# Patient Record
Sex: FEMALE | Race: ASIAN | NOT HISPANIC OR LATINO | Employment: FULL TIME | URBAN - METROPOLITAN AREA
[De-identification: names, ages, dates, MRNs, and addresses within clinical notes are randomized per-mention and may not be internally consistent; named-entity substitution may affect disease eponyms.]

---

## 2021-03-19 ENCOUNTER — OFFICE VISIT (OUTPATIENT)
Dept: OBGYN | Facility: CLINIC | Age: 31
End: 2021-03-19
Payer: COMMERCIAL

## 2021-03-19 VITALS
WEIGHT: 128 LBS | DIASTOLIC BLOOD PRESSURE: 62 MMHG | BODY MASS INDEX: 21.33 KG/M2 | SYSTOLIC BLOOD PRESSURE: 92 MMHG | HEIGHT: 65 IN

## 2021-03-19 DIAGNOSIS — Z01.419 ENCOUNTER FOR GYNECOLOGICAL EXAMINATION WITHOUT ABNORMAL FINDING: Primary | ICD-10-CM

## 2021-03-19 DIAGNOSIS — Z80.0 FAMILY HISTORY OF PANCREATIC CANCER: ICD-10-CM

## 2021-03-19 DIAGNOSIS — Z13.29 SCREENING FOR THYROID DISORDER: ICD-10-CM

## 2021-03-19 LAB
ERYTHROCYTE [DISTWIDTH] IN BLOOD BY AUTOMATED COUNT: 11.5 % (ref 10–15)
HCT VFR BLD AUTO: 39.1 % (ref 35–47)
HGB BLD-MCNC: 13.1 G/DL (ref 11.7–15.7)
MCH RBC QN AUTO: 31.3 PG (ref 26.5–33)
MCHC RBC AUTO-ENTMCNC: 33.5 G/DL (ref 31.5–36.5)
MCV RBC AUTO: 93 FL (ref 78–100)
PLATELET # BLD AUTO: 197 10E9/L (ref 150–450)
RBC # BLD AUTO: 4.19 10E12/L (ref 3.8–5.2)
WBC # BLD AUTO: 6.2 10E9/L (ref 4–11)

## 2021-03-19 PROCEDURE — G0145 SCR C/V CYTO,THINLAYER,RESCR: HCPCS | Performed by: OBSTETRICS & GYNECOLOGY

## 2021-03-19 PROCEDURE — 36415 COLL VENOUS BLD VENIPUNCTURE: CPT | Performed by: OBSTETRICS & GYNECOLOGY

## 2021-03-19 PROCEDURE — 80053 COMPREHEN METABOLIC PANEL: CPT | Performed by: OBSTETRICS & GYNECOLOGY

## 2021-03-19 PROCEDURE — 85027 COMPLETE CBC AUTOMATED: CPT | Performed by: OBSTETRICS & GYNECOLOGY

## 2021-03-19 PROCEDURE — 99385 PREV VISIT NEW AGE 18-39: CPT | Performed by: OBSTETRICS & GYNECOLOGY

## 2021-03-19 PROCEDURE — 84443 ASSAY THYROID STIM HORMONE: CPT | Performed by: OBSTETRICS & GYNECOLOGY

## 2021-03-19 ASSESSMENT — ANXIETY QUESTIONNAIRES
GAD7 TOTAL SCORE: 0
3. WORRYING TOO MUCH ABOUT DIFFERENT THINGS: NOT AT ALL
5. BEING SO RESTLESS THAT IT IS HARD TO SIT STILL: NOT AT ALL
7. FEELING AFRAID AS IF SOMETHING AWFUL MIGHT HAPPEN: NOT AT ALL
IF YOU CHECKED OFF ANY PROBLEMS ON THIS QUESTIONNAIRE, HOW DIFFICULT HAVE THESE PROBLEMS MADE IT FOR YOU TO DO YOUR WORK, TAKE CARE OF THINGS AT HOME, OR GET ALONG WITH OTHER PEOPLE: NOT DIFFICULT AT ALL
1. FEELING NERVOUS, ANXIOUS, OR ON EDGE: NOT AT ALL
6. BECOMING EASILY ANNOYED OR IRRITABLE: NOT AT ALL
2. NOT BEING ABLE TO STOP OR CONTROL WORRYING: NOT AT ALL

## 2021-03-19 ASSESSMENT — PATIENT HEALTH QUESTIONNAIRE - PHQ9
SUM OF ALL RESPONSES TO PHQ QUESTIONS 1-9: 0
5. POOR APPETITE OR OVEREATING: NOT AT ALL

## 2021-03-19 ASSESSMENT — MIFFLIN-ST. JEOR: SCORE: 1298.54

## 2021-03-19 NOTE — PROGRESS NOTES
La is a 30 year old  female who presents for annual exam.     Besides routine health maintenance, has been trying for pregnancy for 3 months.    HPI:    She has been trying for conception for 3 months.  She just started checking ovulation.  She has regular cycles.  She has started pre- vitamins.  She doesn't have dysmenorrhea.  She usually has 28 day intervals.  She started taking ovulation tests last month.  Ovulation was 18 and 20 day of cycle.    Her dad  of pancreatic cancer at 45.       GYNECOLOGIC HISTORY:    Patient's last menstrual period was 2021.    Regular menses? yes  Menses every 28-33 days.  Length of menses: 3 days    Her current contraception method is: none, trying to conceive.  She  reports that she has never smoked. She has never used smokeless tobacco.  Patient is sexually active.    Last PHQ-9 score on record =   PHQ-9 SCORE 3/19/2021   PHQ-9 Total Score 0     Last GAD7 score on record =   FERMIN-7 SCORE 3/19/2021   Total Score 0     Alcohol Score = 1    HEALTH MAINTENANCE:  Cholesterol: (No results found for: CHOL   Last Mammo: N/A, Result: not applicable, Next Mammo: screen age 40  Pap: (No results found for: PAP )   Colonoscopy:  N/A, Result: not applicable, Next Colonoscopy: screen age 50  Dexa: N/A  Health maintenance updated:  Due for Pap    HISTORY:  OB History    Para Term  AB Living   0 0 0 0 0 0   SAB TAB Ectopic Multiple Live Births   0 0 0 0 0       There is no problem list on file for this patient.    Past Surgical History:   Procedure Laterality Date     NO HISTORY OF SURGERY        Social History     Tobacco Use     Smoking status: Never Smoker     Smokeless tobacco: Never Used   Substance Use Topics     Alcohol use: Not on file      Problem (# of Occurrences) Relation (Name,Age of Onset)    Diabetes (1) Maternal Grandfather    Heart Disease (1) Maternal Grandfather    Pancreatic Cancer (1) Father (45)    Thyroid Disease (2) Mother, Maternal  "Grandmother            Current Outpatient Medications   Medication Sig     Prenatal Vit-Fe Fumarate-FA (PRENATAL VITAMINS PO)      No current facility-administered medications for this visit.      No Known Allergies    Past medical, surgical, social and family histories were reviewed and updated in EPIC.    ROS:   Review of systems negative, no abnormal bleeding.  No urinary frequency or dysuria, bladder or kidney problems    EXAM:  BP 92/62   Ht 1.638 m (5' 4.5\")   Wt 58.1 kg (128 lb)   LMP 03/01/2021   BMI 21.63 kg/m     BMI: Body mass index is 21.63 kg/m .    PHYSICAL EXAM:  Constitutional:   Appearance: Well nourished, well developed, alert, in no acute distress  Neck:  Lymph Nodes:  No lymphadenopathy present    Thyroid:  Gland size normal, nontender, no nodules or masses present  on palpation  Chest:  Respiratory Effort:  Breathing unlabored  Cardiovascular:    Heart: Auscultation:  Regular rate, normal rhythm, no murmurs present  Breasts: Inspection of Breasts:  No lymphadenopathy present., Palpation of Breasts and Axillae:  No masses present on palpation, no breast tenderness., Axillary Lymph Nodes:  No lymphadenopathy present. and No nodularity, asymmetry or nipple discharge bilaterally.  Gastrointestinal:   Abdominal Examination:  Abdomen nontender to palpation, tone normal without rigidity or guarding, no masses present, umbilicus without lesions   Liver and Spleen:  No hepatomegaly present, liver nontender to palpation    Hernias:  No hernias present  Lymphatic: Lymph Nodes:  No other lymphadenopathy present  Skin:  General Inspection:  No rashes present, no lesions present, no areas of  discoloration  Neurologic:    Mental Status:  Oriented X3.  Normal strength and tone, sensory exam                grossly normal, mentation intact and speech normal.    Psychiatric:   Mentation appears normal and affect normal/bright.         Pelvic Exam:  External Genitalia:     Normal appearance for age, no " discharge present, no tenderness present, no inflammatory lesions present, color normal  Vagina:     Normal vaginal vault without central or paravaginal defects, no discharge present, no inflammatory lesions present, no masses present  Bladder:     Nontender to palpation  Urethra:   Urethral Body:  Urethra palpation normal, urethra structural support normal   Urethral Meatus:  No erythema or lesions present  Cervix:     Appearance healthy, no lesions present, nontender to palpation, no bleeding present  Uterus:     Uterus: firm, normal sized and nontender  Adnexa:     No adnexal tenderness present, no adnexal masses present  Perineum:     Perineum within normal limits, no evidence of trauma, no rashes or skin lesions present  Anus:     Anus within normal limits, no hemorrhoids present  Inguinal Lymph Nodes:     No lymphadenopathy present  Pubic Hair:     Normal pubic hair distribution for age  Genitalia and Groin:     No rashes present, no lesions present, no areas of discoloration, no masses present      COUNSELING:   Reviewed preventive health counseling, as reflected in patient instructions    BMI: Body mass index is 21.63 kg/m .      ASSESSMENT:  30 year old female with satisfactory annual exam.    ICD-10-CM    1. Encounter for gynecological examination without abnormal finding  Z01.419 Pap imaged thin layer screen reflex to HPV if ASCUS - recommended age 25 - 29 years     CBC with platelets     **Comprehensive metabolic panel FUTURE anytime   2. Screening for thyroid disorder  Z13.29 TSH with free T4 reflex   3. Family history of pancreatic cancer  Z80.0 CANCER RISK MGMT/CANCER GENETIC COUNSELING REFERRAL       PLAN:  1. Pap today for cervical cancer screening  2. Dad at 45  of pancreatic cancer, unsure if he had testing.  Discussed genetic counseling  3. Conception- discussed thyroid testing also.  She is taking prenatal vitamins.  She is timing cycles.  Discussed timing intercourse.    Melinda Luciano  MD Linda

## 2021-03-20 LAB
ALBUMIN SERPL-MCNC: 3.7 G/DL (ref 3.4–5)
ALP SERPL-CCNC: 58 U/L (ref 40–150)
ALT SERPL W P-5'-P-CCNC: 20 U/L (ref 0–50)
ANION GAP SERPL CALCULATED.3IONS-SCNC: 4 MMOL/L (ref 3–14)
AST SERPL W P-5'-P-CCNC: 14 U/L (ref 0–45)
BILIRUB SERPL-MCNC: 0.5 MG/DL (ref 0.2–1.3)
BUN SERPL-MCNC: 12 MG/DL (ref 7–30)
CALCIUM SERPL-MCNC: 8.6 MG/DL (ref 8.5–10.1)
CHLORIDE SERPL-SCNC: 107 MMOL/L (ref 94–109)
CO2 SERPL-SCNC: 26 MMOL/L (ref 20–32)
CREAT SERPL-MCNC: 0.71 MG/DL (ref 0.52–1.04)
GFR SERPL CREATININE-BSD FRML MDRD: >90 ML/MIN/{1.73_M2}
GLUCOSE SERPL-MCNC: 77 MG/DL (ref 70–99)
POTASSIUM SERPL-SCNC: 4.2 MMOL/L (ref 3.4–5.3)
PROT SERPL-MCNC: 7.1 G/DL (ref 6.8–8.8)
SODIUM SERPL-SCNC: 137 MMOL/L (ref 133–144)
TSH SERPL DL<=0.005 MIU/L-ACNC: 2.34 MU/L (ref 0.4–4)

## 2021-03-20 ASSESSMENT — ANXIETY QUESTIONNAIRES: GAD7 TOTAL SCORE: 0

## 2021-03-24 LAB
COPATH REPORT: NORMAL
PAP: NORMAL

## 2021-04-12 NOTE — PROGRESS NOTES
4/13/2021    La is a 30 year old who is being evaluated via a billable video visit.      Video-Visit Details    Type of service: Video Visit    Video Start Time: 12:04 pm  Video End Time: 12:59 pm    Originating Location (pt. Location): Home    Distant Location (provider location): [facility] Risk [facility]    Platform used for Video Visit: [employer]lette    Referring Provider: Melinda Mckeon MD    Presenting Information:   I spoke with La Cornelius over video today for genetic counseling to discuss her family history of cancer. With her permission, this appointment was conducted over video due to COVID-19 precautions. We talked today to review this history, cancer screening recommendations, and available genetic testing options.    Personal History:  La is a 30 year old female. She does not have any personal history of cancer.    She had her first menstrual period at age 14 or 15 and is premenopausal. She does not have any children, but is currently trying to conceive. La has her ovaries, fallopian tubes and uterus in place. She reports that she has not used hormone replacement therapy. She had a right breast ultrasound and diagnostic mammogram on 6/26/19 due to a lump, findings were negative. La has not had a colonoscopy due to her age. La reported no history of tobacco use and alcohol use of 2-3 drinks per month.    Family History: (Please see scanned pedigree for detailed family history information)  Maternal:    Her mother is 50 years old with no known history of cancer.    She is not aware of any diagnoses of cancer in her maternal relatives.  Paternal:    Her father was diagnosed with pancreatic cancer at age 45 and passed away at 45. Treatment included surgery and chemotherapy. He did not drink and had no known chemical exposures. He did have a history of smoking.     Her grandmother is in her mid-late 70s and was first diagnosed with a colon/other GI cancer in 1991 (in  approximately her 40s). Treatment included surgery. She had a recurrence of this cancer in 2014 and again had surgery.     She is not aware of any genetic testing in her paternal relatives.    Her maternal ethnicity is Japanese. Her paternal ethnicity is Japanese. There is no known Ashkenazi Muslim ancestry on either side of her family.     Discussion:    La's family history of cancer is suggestive of a hereditary cancer syndrome.    We reviewed the features of sporadic, familial, and hereditary cancers. In looking at La's family history, it is possible that a cancer susceptibility gene is present due to her father's diagnosis of pancreatic cancer and the history of colon/GI cancer in her paternal grandmother.    We discussed the natural history and genetics of hereditary cancer. Based on her father's diagnosis of pancreatic cancer, we discussed the BRCA1 and BRCA2 genes. Mutations in these genes cause a condition known as Hereditary Breast and Ovarian Cancer syndrome (HBOC). Women with a mutation in either of these genes are at increased risk for breast and ovarian cancer. There is also an increased risk for a second primary breast cancer. Men with a mutation in either of these genes are at increased risk for breast and prostate cancer. Both women and men may also be at increased risk for pancreatic cancer and melanoma.     Additionally, based on her family history of pancreatic and colon/GI cancer, we discussed Patiño syndrome. Patiño syndrome can be caused by a mutation in one of five genes:  MLH1, MSH2, MSH6, PMS2, and EPCAM. The highest cancer risks associated with Patiño syndrome include colon cancer, endometrial/uterine cancer, gastric cancer, and ovarian cancer. Other cancers have also been reported with Patiño syndrome, such as urinary tract, pancreas, bile duct, and others.     A detailed handout regarding these genes and other genes in which mutations are associated with an increased risk for pancreatic  cancer will be provided to La via [employer] and can be found in the after visit summary. Topics included: inheritance pattern, cancer risks, cancer screening recommendations, and also risks, benefits and limitations of testing.    Based on her personal and family history, La meets current National Comprehensive Cancer Network (NCCN) criteria for genetic testing of pancreatic cancer susceptibility genes, as well as high-penetrance breast and/or ovarian cancer susceptibility genes, which often includes BRCA1, BRCA2, CDH1, PALB2, PTEN, and TP53 among others.     We discussed that there are additional genes that could cause increased risk for pancreatic and colon cancer. As many of these genes present with overlapping features in a family and accurate cancer risk cannot always be established based upon the pedigree analysis alone, it would be reasonable for La to consider panel genetic testing to analyze multiple genes at once.    We reviewed genetic testing options for La based on her family history of cancers: a smaller panel of genes associated with specific cancers (such as pancreatic and/or colon) or larger panel options to include genes associated with multiple different types of cancer. She expressed an interest in more broad testing and opted for the CancerNext panel.   Genetic testing is available for 36 genes associated with hereditary cancer: CancerNext (APC, JANAE, AXIN2, BARD1, BMPR1A, BRCA1, BRCA2, BRIP1, CDH1, CDK4, CDKN2A, CHEK2, DICER1, EPCAM, GREM1, HOXB13, MLH1, MSH2, MSH3, MSH6, MUTYH, NBN, NF1, NTHL1, PALB2, PMS2, POLD1, POLE, PTEN, RAD51C, RAD51D, RECQL, SMAD4, SMARCA4, STK11, and TP53).  We discussed that many of the genes in the CancerNext panel are associated with specific hereditary cancer syndromes and published management guidelines: Hereditary Breast and Ovarian Cancer syndrome (BRCA1, BRCA2), Patiño syndrome (MLH1, MSH2, MSH6, PMS2, EPCAM), Familial Adenomatous Polyposis (APC),  Hereditary Diffuse Gastric Cancer (CDH1), Familial Atypical Multiple Mole Melanoma syndrome (CDK4, CDKN2A), Juvenile Polyposis syndrome (BMPR1A, SMAD4), Cowden syndrome (PTEN), Li Fraumeni syndrome (TP53), Peutz-Jeghers syndrome (STK11), MUTYH Associated Polyposis (MUTYH), and Neurofibromatosis type 1 (NF1).   The JANAE, AXIN2, BRIP1, CHEK2, GREM1, MSH3, NBN, NTHL1, PALB2, POLD1, POLE, RAD51C, and RAD51D genes are associated with increased cancer risk and have published management guidelines for certain cancers.    The remaining genes (BARD1, DICER1, HOXB13, RECQL, and SMARCA4) are associated with increased cancer risk and may allow us to make medical recommendations when mutations are identified.      Due to COVID-19 precautions consent was obtained over video today. This is indicated on the consent form, which also includes my signature (as the provider who obtained consent). Genetic testing via the CancerNext panel will be sent to [facility]. La opted to have a saliva sample collection kit shipped directly to her home from [facility]. She will ship the kit back to [facility] for analysis. Turnaround time from date when sample is received at the lab: approximately 3-4 weeks.    Medical Management: For La, we reviewed that the information from genetic testing may determine:    additional cancer screening for which La may qualify (i.e. pancreatic cancer screening, mammogram and breast MRI, more frequent colonoscopies, more frequent dermatologic exams, etc.),    options for risk reducing surgeries La could consider (i.e. bilateral mastectomy, surgery to remove her ovaries and/or uterus, etc.),      and targeted chemotherapies if she were to develop certain cancers in the future (i.e. immunotherapy for individuals with Patiño syndrome, PARP inhibitors, etc.).     These recommendations will be discussed in detail once genetic testing is completed.     Plan:  1) Today La elected to proceed with  genetic testing via the CancerNext panel offered by [email or web address].  2) This information should be available in 4-5 weeks.  3) La will be scheduled for a virtual visit (phone or video) to discuss the results.    Time spent over video: 55 minutes    Seble Escudero MS, [facility] [place]  Licensed, Certified Genetic Counselor  Office: [phone]  Email: [email or web address]

## 2021-04-13 ENCOUNTER — VIRTUAL VISIT (OUTPATIENT)
Dept: ONCOLOGY | Facility: CLINIC | Age: 31
End: 2021-04-13
Attending: OBSTETRICS & GYNECOLOGY
Payer: COMMERCIAL

## 2021-04-13 DIAGNOSIS — Z80.0 FAMILY HISTORY OF COLON CANCER: ICD-10-CM

## 2021-04-13 DIAGNOSIS — Z80.0 FAMILY HISTORY OF PANCREATIC CANCER: Primary | ICD-10-CM

## 2021-04-13 PROCEDURE — 96040 HC GENETIC COUNSELING, EACH 30 MINUTES: CPT | Mod: GT | Performed by: GENETIC COUNSELOR, MS

## 2021-04-13 NOTE — LETTER
Cancer Risk Management  Program Locations    [facility]  [facility]  [facility]  [facility]  [facility]  Mailing Address  Cancer Risk Management Program  [address]  [place] [address]    New patient appointments  [phone]  April 17, 2021    La Cornelius  [address]  [place] [address]      Dear La,    It was a pleasure speaking with you over video for genetic counseling on 4/13/2021. Here is a copy of the progress note from our discussion. If you have any additional questions, please feel free to call.    Referring Provider: Melinda Mckeon MD    Presenting Information:   I spoke with La Cornelius over video today for genetic counseling to discuss her family history of cancer. With her permission, this appointment was conducted over video due to COVID-19 precautions. We talked today to review this history, cancer screening recommendations, and available genetic testing options.    Personal History:  aL is a 30 year old female. She does not have any personal history of cancer.    She had her first menstrual period at age 14 or 15 and is premenopausal. She does not have any children, but is currently trying to conceive. La has her ovaries, fallopian tubes and uterus in place. She reports that she has not used hormone replacement therapy. She had a right breast ultrasound and diagnostic mammogram on 6/26/19 due to a lump, findings were negative. La has not had a colonoscopy due to her age. La reported no history of tobacco use and alcohol use of 2-3 drinks per month.    Family History: (Please see scanned pedigree for detailed family history information)  Maternal:    Her mother is 50 years old with no known history of cancer.    She is not aware of any diagnoses of cancer in her maternal  relatives.  Paternal:    Her father was diagnosed with pancreatic cancer at age 45 and passed away at 45. Treatment included surgery and chemotherapy. He did not drink and had no known chemical exposures. He did have a history of smoking.     Her grandmother is in her mid-late 70s and was first diagnosed with a colon/other GI cancer in 1991 (in approximately her 40s). Treatment included surgery. She had a recurrence of this cancer in 2014 and again had surgery.     She is not aware of any genetic testing in her paternal relatives.    Her maternal ethnicity is Micronesian. Her paternal ethnicity is Micronesian. There is no known Ashkenazi Oriental orthodox ancestry on either side of her family.     Discussion:    La's family history of cancer is suggestive of a hereditary cancer syndrome.    We reviewed the features of sporadic, familial, and hereditary cancers. In looking at La's family history, it is possible that a cancer susceptibility gene is present due to her father's diagnosis of pancreatic cancer and the history of colon/GI cancer in her paternal grandmother.    We discussed the natural history and genetics of hereditary cancer. Based on her father's diagnosis of pancreatic cancer, we discussed the BRCA1 and BRCA2 genes. Mutations in these genes cause a condition known as Hereditary Breast and Ovarian Cancer syndrome (HBOC). Women with a mutation in either of these genes are at increased risk for breast and ovarian cancer. There is also an increased risk for a second primary breast cancer. Men with a mutation in either of these genes are at increased risk for breast and prostate cancer. Both women and men may also be at increased risk for pancreatic cancer and melanoma.     Additionally, based on her family history of pancreatic and colon/GI cancer, we discussed Patiño syndrome. Patiño syndrome can be caused by a mutation in one of five genes:  MLH1, MSH2, MSH6, PMS2, and EPCAM. The highest cancer risks associated with Patiño  syndrome include colon cancer, endometrial/uterine cancer, gastric cancer, and ovarian cancer. Other cancers have also been reported with Patiño syndrome, such as urinary tract, pancreas, bile duct, and others.     A detailed handout regarding these genes and other genes in which mutations are associated with an increased risk for pancreatic cancer will be provided to La via [employer] and can be found in the after visit summary. Topics included: inheritance pattern, cancer risks, cancer screening recommendations, and also risks, benefits and limitations of testing.    Based on her personal and family history, La meets current National Comprehensive Cancer Network (NCCN) criteria for genetic testing of pancreatic cancer susceptibility genes, as well as high-penetrance breast and/or ovarian cancer susceptibility genes, which often includes BRCA1, BRCA2, CDH1, PALB2, PTEN, and TP53 among others.     We discussed that there are additional genes that could cause increased risk for pancreatic and colon cancer. As many of these genes present with overlapping features in a family and accurate cancer risk cannot always be established based upon the pedigree analysis alone, it would be reasonable for La to consider panel genetic testing to analyze multiple genes at once.    We reviewed genetic testing options for La based on her family history of cancers: a smaller panel of genes associated with specific cancers (such as pancreatic and/or colon) or larger panel options to include genes associated with multiple different types of cancer. She expressed an interest in more broad testing and opted for the CancerNext panel.   Genetic testing is available for 36 genes associated with hereditary cancer: CancerNext (APC, JANAE, AXIN2, BARD1, BMPR1A, BRCA1, BRCA2, BRIP1, CDH1, CDK4, CDKN2A, CHEK2, DICER1, EPCAM, GREM1, HOXB13, MLH1, MSH2, MSH3, MSH6, MUTYH, NBN, NF1, NTHL1, PALB2, PMS2, POLD1, POLE, PTEN, RAD51C, RAD51D, RECQL,  SMAD4, SMARCA4, STK11, and TP53).  We discussed that many of the genes in the CancerNext panel are associated with specific hereditary cancer syndromes and published management guidelines: Hereditary Breast and Ovarian Cancer syndrome (BRCA1, BRCA2), Patiño syndrome (MLH1, MSH2, MSH6, PMS2, EPCAM), Familial Adenomatous Polyposis (APC), Hereditary Diffuse Gastric Cancer (CDH1), Familial Atypical Multiple Mole Melanoma syndrome (CDK4, CDKN2A), Juvenile Polyposis syndrome (BMPR1A, SMAD4), Cowden syndrome (PTEN), Li Fraumeni syndrome (TP53), Peutz-Jeghers syndrome (STK11), MUTYH Associated Polyposis (MUTYH), and Neurofibromatosis type 1 (NF1).   The JANAE, AXIN2, BRIP1, CHEK2, GREM1, MSH3, NBN, NTHL1, PALB2, POLD1, POLE, RAD51C, and RAD51D genes are associated with increased cancer risk and have published management guidelines for certain cancers.    The remaining genes (BARD1, DICER1, HOXB13, RECQL, and SMARCA4) are associated with increased cancer risk and may allow us to make medical recommendations when mutations are identified.      Due to COVID-19 precautions consent was obtained over video today. This is indicated on the consent form, which also includes my signature (as the provider who obtained consent). Genetic testing via the CancerNext panel will be sent to [facility]. La opted to have a saliva sample collection kit shipped directly to her home from [facility]. She will ship the kit back to [facility] for analysis. Turnaround time from date when sample is received at the lab: approximately 3-4 weeks.    Medical Management: For La, we reviewed that the information from genetic testing may determine:    additional cancer screening for which La may qualify (i.e. pancreatic cancer screening, mammogram and breast MRI, more frequent colonoscopies, more frequent dermatologic exams, etc.),    options for risk reducing surgeries La could consider (i.e. bilateral mastectomy, surgery to remove  her ovaries and/or uterus, etc.),      and targeted chemotherapies if she were to develop certain cancers in the future (i.e. immunotherapy for individuals with Patiño syndrome, PARP inhibitors, etc.).     These recommendations will be discussed in detail once genetic testing is completed.     Plan:  1) Today La elected to proceed with genetic testing via the CancerNext panel offered by [facility].  2) This information should be available in 4-5 weeks.  3) La will be scheduled for a virtual visit (phone or video) to discuss the results.    Seble Escudero MS, [facility]  Licensed, Certified Genetic Counselor  Office: [phone]  Email: [email or web address]                              Assessing Cancer Risk  Only about 5-10% of cancers are thought to be due to an inherited cancer susceptibility gene. These families often have:    Several people with the same or related types of cancer    Cancers diagnosed at a young age (before age 50)    Individuals with more than one primary cancer    Multiple generations of the family affected with cancer    Some people may be candidates for genetic testing of more than one gene.  For these families, genetic testing using a cancer panel may be offered.  These panels can test many genes at once known to increase the risk for pancreatic (and other) cancers: APC, JANAE, BRCA1, BRCA2, CDKN2A, EPCAM, MLH1, MSH2, MSH6, PALB2, PMS2, STK11, and TP53. The purpose of this handout is to serve as a brief summary of the pancreatic cancer risk genes and cancer syndrome with pancreatic cancer risks.     Hereditary Breast and Ovarian Cancer Syndrome  (BRCA1 and BRCA2)    A single mutation in one of the copies of BRCA1 or BRCA2 increases the risk for breast and ovarian cancer.  The risk for pancreatic cancer and melanoma may be slightly increased in some families. BRCA2 is considered the most common gene responsible for familial pancreatic cancer.    A person s ethnic background is also important  to consider, as individuals of Ashkenazi Denominational ancestry have a higher chance of having a BRCA gene mutation.  There are three BRCA mutations that occur more frequently in this population.    Patiño Syndrome  (MLH1, MSH2, MSH6, PMS2, and EPCAM)    Currently five genes are known to cause Patiño Syndrome: MLH1, MSH2, MSH6, PMS2, and EPCAM.  A single mutation in one of the Patiño Syndrome genes increases the risk for colon, endometrial, ovarian, and stomach cancers.  Other cancers that occur less commonly in Patioñ Syndrome include urinary tract cancers, brain cancers, and other cancers.  People who have Patiño Syndrome have up to a 6% risk of pancreatic cancer.     *Cancer risk varies depending on Patiño syndrome gene found    Familial Atypical Multiple Mole Melanoma Syndrome  (CDKN2A)    Familial Atypical Multiple Mole Melanoma Syndrome (FAMMM) is caused by a single mutation in the CDKN2A gene. This gene used to be called p16. The lifetime risk for melanoma is between 58-92%5. People with FAMMM have increased risks for pancreatic cancer, and possibly other cancers.      People with FAMMM typically have many moles (more than 50), which can be atypical.The exact risk of pancreatic cancer can depend on a person s specific CDKN2A mutation. The risk for pancreatic cancer be as high as 60% depending on the mutation.    Peutz-Jeghers Syndrome  (STK11)    Peutz-Jeghers Syndrome is caused by a mutation in the STK11 gene. The main features of Peutz-Jeghers Syndrome are multiple hamartomatous colon polyps and blue pigmentation of the lips and oral mucosa. Cancers associated with Peutz-Jeghers Syndrome include: gastrointestinal, gynecological, lung, breast, and pancreatic cancer. Up to a 36% lifetime risk of developing pancreatic cancer has been reported for those with Peutz-Jeghers syndrome.     Li-Fraumeni Syndrome  (TP53)    Li-Fraumeni Syndrome (LFS) is a cancer predisposition syndrome caused by a mutation in the TP53 gene. A  single mutation in one of the copies of TP53 increases the risk for multiple cancers. Individuals with LFS are at an increased risk for developing cancer at a young age. The lifetime risk for development of a LFS-associated cancer is 50% by age 30 and 90% by age 60.      Core Cancers: Sarcomas, Breast, Brain, Lung, Leukemias/Lymphomas, Adrenocortical carcinomas    Other Cancers: Gastrointestinal (including pancreatic), Thyroid, Skin, Genitourinary    Familial Adenomatous Polyposis Syndrome  (APC)    Familial Adenomatous Polyposis syndrome (FAP) is caused by a single mutation in the APC gene and is characterize by having over 100 adenomatous polyps in the colon and significantly increased risk for colon cancer. These typically appear during adolescence. FAP is associated with other tumors in the thyroid, stomach, and duodenum. People with FAP have an increased lifetime pancreatic cancer risk over the general population.    Additional Genes  PALB2  Mutations in the PALB2 gene have been shown to increase the risk of breast cancer up to 58% in some families. PALB2 mutations have also been associated with increased risk for pancreatic cancer.  Individuals who inherit two PALB2 mutations--one from their mother and one from their father--have a condition called Fanconi Anemia.  This condition is associated with short stature, developmental delay, bone marrow failure, and increased risk for childhood cancers.    JANAE  Mutations in the JANAE gene typically increase the risk of breast cancer 2-4 times higher than an average woman. JANAE mutations have also been associated with an increased risk of pancreatic cancer. People who inherit an JANAE mutation from both their mother and father have a condition called ataxia-telangiectasia. Ataxia telangiectasia is associated with ataxia in childhood (trouble with balance and walking), telangiectasias (red or purple spotty clusters on the skin), involuntary movements, frequent infections,  and increased risk of leukemia and lymphoma.     Inheritance    All of the genes reviewed above are inherited in an autosomal dominant pattern.  This means that if a parent has a mutation, each of their children will have a 50% chance of inheriting that same mutation.  Every child--male or female--would have a 50% chance of inheriting the mutation and being at increased risk for developing cancer.       https://r.nlm.nih.gov/primer/inheritance/inheritancepatterns    Mutations in some genes can occur de blaine, which means that a person s mutation occurred for the first time in them and was not inherited from a parent.  Now that they have the mutation, however, it can be passed on to future generations.     Genetic Testing  Genetic testing involves a blood test and will look for any harmful mutations that are associated with increased cancer risk.  If possible, it is recommended that the person(s) who has had cancer be tested before other family members.  That person will give us the most useful information about whether or not a specific gene is associated with the cancer in the family.    Advantages and Disadvantages   There are advantages and disadvantages to genetic testing.  Advantages    May clarify your cancer risk and additional appropriate cancer screenings     Can help you make medical decisions    May explain the cancers in your family    May give useful information to your family members (if you share your results)     Disadvantages    Possible negative emotional impact of learning about inherited cancer risk    Uncertainty in interpreting a negative test result in some situations    Possible genetic discrimination concerns (see below)     Genetic Information Nondiscrimination Act (RENZO)  RENZO is a federal law that protects individuals from health insurance or employment discrimination based on a genetic test result.  There are currently no legal discrimination protections in terms of life insurance, long  term care, or disability insurances.  Visit the National Human Genome Research [place] website to learn more: https://www.genome.gov/65384050/genetic-discrimination/    Questions to Think About Regarding Genetic Testing:    What effect will the test result have on me and my relationship with my family members if I have an inherited gene mutation?  If I don t have a gene mutation?    Should I share my test results, and how will my family react to this news, which may also affect them?    Are my children ready to learn new information that may one day affect their own health?    There are three possible results of genetic testing:    Positive--a harmful mutation was identified in one or more of the genes    Negative--no mutation was identified in any of the genes on this panel    Variant of unknown significance (VUS)--a variation in one of the genes was identified, but it is unclear how this impacts cancer risk in the family  o Families with VUS results can contact their genetic counselor annually for updates     Reducing Cancer Risk  Recommendations are based upon an individual s genetic test result as well as their personal and family history of cancer. Pancreatic cancer screening may be available based on family history. Talk with your physician about screening options.     Cancer Resources      National Pancreatic Cancer Foundation: npcf.     Pancreatic Cancer Action Network: pancan.org     FORCE: Facing Our Risk of Cancer Empowered: ON24ourEducreations.org    [place]: bebrswathipink.org    Li-Fraumeni Syndrome Association: lfsassociation.org    Collaborative Group of the Americas on Inherited Colorectal Cancer (CGA)   o cgaicc.com http://www.facingourrisk.org/    Cancer Care: cancercare.org    American Cancer Society (ACS): cancer.org    National Cancer [place] (NCI): cancer.gov      Please call us if you have any questions or concerns.   Cancer Risk Management Program [phone]  ([phone])  ? Twan Kimball, MS, [facility] [phone]  ? Leslee Huertas, MS, [facility]  [phone]  ? Rosa Iverson, MS, [facility]  [phone]  ? Patricia Tate, MS, [facility] [phone]  ? Bella Olson, MS, [facility] [phone]   ? Seble Escudero, MS, [facility]  [phone]    References  1. Genetic / Familial High-Risk Assessment?: Breast and Ovarian. NCCN Pract Guidel Oncol. 2017;1.2018.  2. Juan Antonio J, Robert A, Albin J, et al. The incidence of pancreatic cancer in BRCA1 and BRCA2 mutation carriers. Br J Cancer. 2012;107(12):0117-4750. doi:10.1038/bjc.2012.483.  3. Cesario DB, Jannette KG, Garo S, et al. BRCA1, BRCA2, PALB2, and CDKN2A mutations in familial pancreatic cancer: a PACGENE study. Melba Med. 2015;17(7):569-577. doi:10.1038/gim.2014.153.  4. William G. Genetic / Familial High-Risk Assessment?: Colorectal. NCCN Pract Guidel Oncol. 2017;2.2017.  5. Rebekah KAPOOR,  M, Jaskaran E, Liss J. Familial Atypical Multiple Mole Melanoma Syndrome. National Center for Biotechnology Information (); 2009. http://www.ncbi.nlm.nih.gov/pubmed/84582044. Accessed October 10, 2017.  6. Patiño HT, Neelam RM, Ashley J, et al. Tumour spectrum in the FAMMM syndrome. Br J Cancer. 1981;44(4):553-560. http://www.ncbi.nlm.nih.gov/pubmed/2792382. Accessed October 10, 2017.  7. Yenifer F, Kareem J, Ragini A, et al. Very high risk of cancer in familial Peutz-Jeghers syndrome. Gastroenterology. 2000;119(6):6617-1581. doi:10.1053/DEMETRICE.2000.71540.  8. Giardiello FM, Offerhaus GJ, Miles DH, et al. Increased risk of thyroid and pancreatic carcinoma in familial adenomatous polyposis. Gut. 1993;34(10):8236-7169. doi:10.1136/GUT.34.10.1394.

## 2021-04-13 NOTE — LETTER
4/13/2021      RE: La Cornelius  [address]  [place] [address]       4/13/2021    La is a 30 year old who is being evaluated via a billable video visit.      Video-Visit Details    Type of service: Video Visit    Video Start Time: 12:04 pm  Video End Time: 12:59 pm    Originating Location (pt. Location): Home    Distant Location (provider location): Cancer Risk Management Program    Platform used for Video Visit: [facility]    Referring Provider: Melinda Mckeon MD    Presenting Information:   I spoke with La Cornelius over video today for genetic counseling to discuss her family history of cancer. With her permission, this appointment was conducted over video due to COVID-19 precautions. We talked today to review this history, cancer screening recommendations, and available genetic testing options.    Personal History:  La is a 30 year old female. She does not have any personal history of cancer.    She had her first menstrual period at age 14 or 15 and is premenopausal. She does not have any children, but is currently trying to conceive. La has her ovaries, fallopian tubes and uterus in place. She reports that she has not used hormone replacement therapy. She had a right breast ultrasound and diagnostic mammogram on 6/26/19 due to a lump, findings were negative. La has not had a colonoscopy due to her age. La reported no history of tobacco use and alcohol use of 2-3 drinks per month.    Family History: (Please see scanned pedigree for detailed family history information)  Maternal:    Her mother is 50 years old with no known history of cancer.    She is not aware of any diagnoses of cancer in her maternal relatives.  Paternal:    Her father was diagnosed with pancreatic cancer at age 45 and passed away at 45. Treatment included surgery and chemotherapy. He did not drink and had no known chemical exposures. He did have a history of smoking.     Her grandmother is in her  mid-late 70s and was first diagnosed with a colon/other GI cancer in 1991 (in approximately her 40s). Treatment included surgery. She had a recurrence of this cancer in 2014 and again had surgery.     She is not aware of any genetic testing in her paternal relatives.    Her maternal ethnicity is [place]. Her paternal ethnicity is [place]. There is no known Ashkenazi Yazidism ancestry on either side of her family.     Discussion:    La's family history of cancer is suggestive of a hereditary cancer syndrome.    We reviewed the features of sporadic, familial, and hereditary cancers. In looking at La's family history, it is possible that a cancer susceptibility gene is present due to her father's diagnosis of pancreatic cancer and the history of colon/GI cancer in her paternal grandmother.    We discussed the natural history and genetics of hereditary cancer. Based on her father's diagnosis of pancreatic cancer, we discussed the BRCA1 and BRCA2 genes. Mutations in these genes cause a condition known as Hereditary Breast and Ovarian Cancer syndrome (HBOC). Women with a mutation in either of these genes are at increased risk for breast and ovarian cancer. There is also an increased risk for a second primary breast cancer. Men with a mutation in either of these genes are at increased risk for breast and prostate cancer. Both women and men may also be at increased risk for pancreatic cancer and melanoma.     Additionally, based on her family history of pancreatic and colon/GI cancer, we discussed Patiño syndrome. Patiño syndrome can be caused by a mutation in one of five genes:  MLH1, MSH2, MSH6, PMS2, and EPCAM. The highest cancer risks associated with Patiño syndrome include colon cancer, endometrial/uterine cancer, gastric cancer, and ovarian cancer. Other cancers have also been reported with Patiño syndrome, such as urinary tract, pancreas, bile duct, and others.     A detailed handout regarding these genes and other  genes in which mutations are associated with an increased risk for pancreatic cancer will be provided to La via [employer] and can be found in the after visit summary. Topics included: inheritance pattern, cancer risks, cancer screening recommendations, and also risks, benefits and limitations of testing.    Based on her personal and family history, La meets current National Comprehensive Cancer Network (NCCN) criteria for genetic testing of pancreatic cancer susceptibility genes, as well as high-penetrance breast and/or ovarian cancer susceptibility genes, which often includes BRCA1, BRCA2, CDH1, PALB2, PTEN, and TP53 among others.     We discussed that there are additional genes that could cause increased risk for pancreatic and colon cancer. As many of these genes present with overlapping features in a family and accurate cancer risk cannot always be established based upon the pedigree analysis alone, it would be reasonable for La to consider panel genetic testing to analyze multiple genes at once.    We reviewed genetic testing options for La based on her family history of cancers: a smaller panel of genes associated with specific cancers (such as pancreatic and/or colon) or larger panel options to include genes associated with multiple different types of cancer. She expressed an interest in more broad testing and opted for the CancerNext panel.   Genetic testing is available for 36 genes associated with hereditary cancer: CancerNext (APC, JANAE, AXIN2, BARD1, BMPR1A, BRCA1, BRCA2, BRIP1, CDH1, CDK4, CDKN2A, CHEK2, DICER1, EPCAM, GREM1, HOXB13, MLH1, MSH2, MSH3, MSH6, MUTYH, NBN, NF1, NTHL1, PALB2, PMS2, POLD1, POLE, PTEN, RAD51C, RAD51D, RECQL, SMAD4, SMARCA4, STK11, and TP53).  We discussed that many of the genes in the CancerNext panel are associated with specific hereditary cancer syndromes and published management guidelines: Hereditary Breast and Ovarian Cancer syndrome (BRCA1, BRCA2), Patiño  syndrome (MLH1, MSH2, MSH6, PMS2, EPCAM), Familial Adenomatous Polyposis (APC), Hereditary Diffuse Gastric Cancer (CDH1), Familial Atypical Multiple Mole Melanoma syndrome (CDK4, CDKN2A), Juvenile Polyposis syndrome (BMPR1A, SMAD4), Cowden syndrome (PTEN), Li Fraumeni syndrome (TP53), Peutz-Jeghers syndrome (STK11), MUTYH Associated Polyposis (MUTYH), and Neurofibromatosis type 1 (NF1).   The JANAE, AXIN2, BRIP1, CHEK2, GREM1, MSH3, NBN, NTHL1, PALB2, POLD1, POLE, RAD51C, and RAD51D genes are associated with increased cancer risk and have published management guidelines for certain cancers.    The remaining genes (BARD1, DICER1, HOXB13, RECQL, and SMARCA4) are associated with increased cancer risk and may allow us to make medical recommendations when mutations are identified.      Due to COVID-19 precautions consent was obtained over video today. This is indicated on the consent form, which also includes my signature (as the provider who obtained consent). Genetic testing via the CancerNext panel will be sent to [facility]. La opted to have a saliva sample collection kit shipped directly to her home from [facility]. She will ship the kit back to [facility] for analysis. Turnaround time from date when sample is received at the lab: approximately 3-4 weeks.    Medical Management: For La, we reviewed that the information from genetic testing may determine:    additional cancer screening for which La may qualify (i.e. pancreatic cancer screening, mammogram and breast MRI, more frequent colonoscopies, more frequent dermatologic exams, etc.),    options for risk reducing surgeries La could consider (i.e. bilateral mastectomy, surgery to remove her ovaries and/or uterus, etc.),      and targeted chemotherapies if she were to develop certain cancers in the future (i.e. immunotherapy for individuals with Patiño syndrome, PARP inhibitors, etc.).     These recommendations will be discussed in detail once  genetic testing is completed.     Plan:  1) Today La elected to proceed with genetic testing via the CancerNext panel offered by [facility].  2) This information should be available in 4-5 weeks.  3) La will be scheduled for a virtual visit (phone or video) to discuss the results.    Time spent over video: 55 minutes    Seble Escudero MS, [facility]  Licensed, Certified Genetic Counselor  Office: [phone]  Email: [email or web address]          Seble Escudero GC

## 2021-04-17 NOTE — PATIENT INSTRUCTIONS
Assessing Cancer Risk  Only about 5-10% of cancers are thought to be due to an inherited cancer susceptibility gene. These families often have:    Several people with the same or related types of cancer    Cancers diagnosed at a young age (before age 50)    Individuals with more than one primary cancer    Multiple generations of the family affected with cancer    Some people may be candidates for genetic testing of more than one gene.  For these families, genetic testing using a cancer panel may be offered.  These panels can test many genes at once known to increase the risk for pancreatic (and other) cancers: APC, JANAE, BRCA1, BRCA2, CDKN2A, EPCAM, MLH1, MSH2, MSH6, PALB2, PMS2, STK11, and TP53. The purpose of this handout is to serve as a brief summary of the pancreatic cancer risk genes and cancer syndrome with pancreatic cancer risks.     Hereditary Breast and Ovarian Cancer Syndrome  (BRCA1 and BRCA2)    A single mutation in one of the copies of BRCA1 or BRCA2 increases the risk for breast and ovarian cancer.  The risk for pancreatic cancer and melanoma may be slightly increased in some families. BRCA2 is considered the most common gene responsible for familial pancreatic cancer.    A person s ethnic background is also important to consider, as individuals of Ashkenazi Protestant ancestry have a higher chance of having a BRCA gene mutation.  There are three BRCA mutations that occur more frequently in this population.    Patiño Syndrome  (MLH1, MSH2, MSH6, PMS2, and EPCAM)    Currently five genes are known to cause Patiño Syndrome: MLH1, MSH2, MSH6, PMS2, and EPCAM.  A single mutation in one of the Patiño Syndrome genes increases the risk for colon, endometrial, ovarian, and stomach cancers.  Other cancers that occur less commonly in Patiño Syndrome include urinary tract cancers, brain cancers, and other cancers.  People who have Patiño Syndrome have up to a 6% risk of pancreatic cancer.     *Cancer risk varies  depending on Patiño syndrome gene found    Familial Atypical Multiple Mole Melanoma Syndrome  (CDKN2A)    Familial Atypical Multiple Mole Melanoma Syndrome (FAMMM) is caused by a single mutation in the CDKN2A gene. This gene used to be called p16. The lifetime risk for melanoma is between 58-92%5. People with FAMMM have increased risks for pancreatic cancer, and possibly other cancers.      People with FAMMM typically have many moles (more than 50), which can be atypical.The exact risk of pancreatic cancer can depend on a person s specific CDKN2A mutation. The risk for pancreatic cancer be as high as 60% depending on the mutation.    Peutz-Jeghers Syndrome  (STK11)    Peutz-Jeghers Syndrome is caused by a mutation in the STK11 gene. The main features of Peutz-Jeghers Syndrome are multiple hamartomatous colon polyps and blue pigmentation of the lips and oral mucosa. Cancers associated with Peutz-Jeghers Syndrome include: gastrointestinal, gynecological, lung, breast, and pancreatic cancer. Up to a 36% lifetime risk of developing pancreatic cancer has been reported for those with Peutz-Jeghers syndrome.     Li-Fraumeni Syndrome  (TP53)    Li-Fraumeni Syndrome (LFS) is a cancer predisposition syndrome caused by a mutation in the TP53 gene. A single mutation in one of the copies of TP53 increases the risk for multiple cancers. Individuals with LFS are at an increased risk for developing cancer at a young age. The lifetime risk for development of a LFS-associated cancer is 50% by age 30 and 90% by age 60.      Core Cancers: Sarcomas, Breast, Brain, Lung, Leukemias/Lymphomas, Adrenocortical carcinomas    Other Cancers: Gastrointestinal (including pancreatic), Thyroid, Skin, Genitourinary    Familial Adenomatous Polyposis Syndrome  (APC)    Familial Adenomatous Polyposis syndrome (FAP) is caused by a single mutation in the APC gene and is characterize by having over 100 adenomatous polyps in the colon and significantly  increased risk for colon cancer. These typically appear during adolescence. FAP is associated with other tumors in the thyroid, stomach, and duodenum. People with FAP have an increased lifetime pancreatic cancer risk over the general population.    Additional Genes  PALB2  Mutations in the PALB2 gene have been shown to increase the risk of breast cancer up to 58% in some families. PALB2 mutations have also been associated with increased risk for pancreatic cancer.  Individuals who inherit two PALB2 mutations--one from their mother and one from their father--have a condition called Fanconi Anemia.  This condition is associated with short stature, developmental delay, bone marrow failure, and increased risk for childhood cancers.    JANAE  Mutations in the JANAE gene typically increase the risk of breast cancer 2-4 times higher than an average woman. JANAE mutations have also been associated with an increased risk of pancreatic cancer. People who inherit an JANAE mutation from both their mother and father have a condition called ataxia-telangiectasia. Ataxia telangiectasia is associated with ataxia in childhood (trouble with balance and walking), telangiectasias (red or purple spotty clusters on the skin), involuntary movements, frequent infections, and increased risk of leukemia and lymphoma.     Inheritance    All of the genes reviewed above are inherited in an autosomal dominant pattern.  This means that if a parent has a mutation, each of their children will have a 50% chance of inheriting that same mutation.  Every child--male or female--would have a 50% chance of inheriting the mutation and being at increased risk for developing cancer.       https://r.nlm.nih.gov/primer/inheritance/inheritancepatterns    Mutations in some genes can occur de blaine, which means that a person s mutation occurred for the first time in them and was not inherited from a parent.  Now that they have the mutation, however, it can be passed on  to future generations.     Genetic Testing  Genetic testing involves a blood test and will look for any harmful mutations that are associated with increased cancer risk.  If possible, it is recommended that the person(s) who has had cancer be tested before other family members.  That person will give us the most useful information about whether or not a specific gene is associated with the cancer in the family.    Advantages and Disadvantages   There are advantages and disadvantages to genetic testing.  Advantages    May clarify your cancer risk and additional appropriate cancer screenings     Can help you make medical decisions    May explain the cancers in your family    May give useful information to your family members (if you share your results)     Disadvantages    Possible negative emotional impact of learning about inherited cancer risk    Uncertainty in interpreting a negative test result in some situations    Possible genetic discrimination concerns (see below)     Genetic Information Nondiscrimination Act (RENZO)  RENZO is a federal law that protects individuals from health insurance or employment discrimination based on a genetic test result.  There are currently no legal discrimination protections in terms of life insurance, long term care, or disability insurances.  Visit the National Human Genome Research [place] website to learn more: https://www.genome.gov/21524576/genetic-discrimination/    Questions to Think About Regarding Genetic Testing:    What effect will the test result have on me and my relationship with my family members if I have an inherited gene mutation?  If I don t have a gene mutation?    Should I share my test results, and how will my family react to this news, which may also affect them?    Are my children ready to learn new information that may one day affect their own health?    There are three possible results of genetic testing:    Positive--a harmful mutation was identified in  one or more of the genes    Negative--no mutation was identified in any of the genes on this panel    Variant of unknown significance (VUS)--a variation in one of the genes was identified, but it is unclear how this impacts cancer risk in the family  o Families with VUS results can contact their genetic counselor annually for updates     Reducing Cancer Risk  Recommendations are based upon an individual s genetic test result as well as their personal and family history of cancer. Pancreatic cancer screening may be available based on family history. Talk with your physician about screening options.     Cancer Resources      National Pancreatic Cancer Foundation: npcf.     Pancreatic Cancer Action Network: pancan.org     FORCE: Facing Our Risk of Cancer Empowered: facingourrisk.org    [place]: [place].org    Li-Fraumeni Syndrome Association: lfsassociation.org    Collaborative Group of the Americas on Inherited Colorectal Cancer (CGA)   o cgaicc.com http://www.facingXtalic.org/    Cancer Care: cancercare.org    American Cancer Society (ACS): cancer.org    National Cancer [place] (NCI): cancer.gov      Please call us if you have any questions or concerns.   Cancer Risk Management Program [phone] ([phone])  ? Twan Kimball, MS, [facility] [phone]  ? Leslee Huertas, MS, [facility]  [phone]  ? Rosa Iverson, MS, [facility]  [phone]  ? Patricia Tate, MS, [facility] [phone]  ? Bella Olson, MS, [facility] [phone]   ? Seble Escudero, MS, [facility]  [phone]    References  1. Genetic / Familial High-Risk Assessment?: Breast and Ovarian. NCCN Pract Guidel Oncol. 2017;1.2018.  2. Juan Antonio J, Robert A, Albin J, et al. The incidence of pancreatic cancer in BRCA1 and BRCA2 mutation carriers. Br J Cancer. 2012;107(12):9424-1318. doi:10.1038/bjc.2012.483.  3. Cesario MARR, Jannette KG, Garo S, et al. BRCA1, BRCA2, PALB2, and CDKN2A mutations in familial pancreatic cancer: a PACGENE study. Melba Med. 2015;17(7):569-577.  doi:10.1038/gim.2014.153.  4. William VILLALPANDO. Genetic / Familial High-Risk Assessment?: Colorectal. NCCN Pract Guidel Oncol. 2017;2.2017.  5. Rebekah KAPOOR, Bishop NICOLE, Jaskaran E, Liss LUCIO. Familial Atypical Multiple Mole Melanoma Syndrome. National Center for Biotechnology Information (); 2009. http://www.ncbi.nlm.nih.gov/pubmed/38959391. Accessed October 10, 2017.  6. Patiño HT, Neelam RM, Ashley J, et al. Tumour spectrum in the FAMMM syndrome. Br J Cancer. 1981;44(4):553-560. http://www.ncbi.nlm.nih.gov/pubmed/9545318. Accessed October 10, 2017.  7. Yenifer F, Kareem J, Ragini A, et al. Very high risk of cancer in familial Peutz-Jeghers syndrome. Gastroenterology. 2000;119(6):4570-7009. doi:10.1053/DEMETRICE.2000.33259.  8. Yenifer GRAY, Offerhaus GJ, Miles DH, et al. Increased risk of thyroid and pancreatic carcinoma in familial adenomatous polyposis. Gut. 1993;34(10):6632-3026. doi:10.1136/GUT.34.10.1394.

## 2021-05-01 ENCOUNTER — HEALTH MAINTENANCE LETTER (OUTPATIENT)
Age: 31
End: 2021-05-01

## 2021-05-12 DIAGNOSIS — Z80.0 FAMILY HISTORY OF PANCREATIC CANCER: ICD-10-CM

## 2021-05-12 DIAGNOSIS — Z80.0 FAMILY HISTORY OF COLON CANCER: ICD-10-CM

## 2021-05-26 LAB
LAB SCANNED RESULT: NORMAL
MISCELLANEOUS TEST: NORMAL

## 2021-05-28 ENCOUNTER — TELEPHONE (OUTPATIENT)
Dept: ONCOLOGY | Facility: CLINIC | Age: 31
End: 2021-05-28

## 2021-05-28 NOTE — TELEPHONE ENCOUNTER
5/28/21 - [facility] to call [phone] opt5, opt2 to schedule virtual return visit with Seble Escudero ANYTIME for genetic testing results. -Sudheer

## 2021-06-08 ENCOUNTER — VIRTUAL VISIT (OUTPATIENT)
Dept: ONCOLOGY | Facility: CLINIC | Age: 31
End: 2021-06-08
Attending: GENETIC COUNSELOR, MS
Payer: COMMERCIAL

## 2021-06-08 DIAGNOSIS — Z80.0 FAMILY HISTORY OF PANCREATIC CANCER: Primary | ICD-10-CM

## 2021-06-08 PROCEDURE — 999N000069 HC STATISTIC GENETIC COUNSELING, < 16 MIN: Mod: GT | Performed by: GENETIC COUNSELOR, MS

## 2021-06-08 NOTE — LETTER
Cancer Risk Management  Program [facility]  [facility]  [facility]  [facility]  Mailing Address  Cancer Risk Management Program  [address]  [place] [address]    New patient appointments  [phone]  June 13, 2021    La Cornelius  [address]  [place] [address]    Dear La,    It was a pleasure speaking with you on the phone for genetic counseling on 6/8/2021. Here is a copy of the progress note from our discussion. If you have any additional questions, please feel free to call.    Presenting Information:  I spoke to La over the phone today to discuss her genetic testing results. Testing was performed on a saliva sample collected by La at her home. The CancerNext panel was ordered from [email or web address]. This testing was done because of La's family history of cancer.    Genetic Testing Result: NEGATIVE  La is negative for mutations in the 36 genes analyzed: APC, JANAE, AXIN2, BARD1, BMPR1A, BRCA1, BRCA2, BRIP1, CDH1, CDK4, CDKN2A, CHEK2, DICER1, MLH1, MSH2, MSH3, MSH6, MUTYH, NBN, NF1, NTHL1, PALB2, PMS2, PTEN, RAD51C, RAD51D, RECQL, SMAD4, SMARCA4, STK11 and TP53 (sequencing and deletion/duplication); HOXB13, POLD1 and POLE (sequencing only); EPCAM and GREM1 (deletion/duplication only).    Interpretation:  We discussed several different interpretations of this negative test result.    1. One explanation may be that there is a different gene or combination of genes and environment that are associated with the cancers in this family.  2. It is possible that her relatives have a mutation in one of these genes and she did not inherit it.  3. There is also a small possibility that there is a mutation in one of these genes, and the testing laboratory could not find it with their current testing methods.        Screening:  Based on this negative test result, it is important for La and her relatives to refer back to the family history for appropriate cancer screening.      We discussed her paternal grandmother's history of colon or other GI cancer. It would be helpful for La to see if she can find out more about this diagnosis, as it may help clarify her own screening recommendations. We reviewed that per current NCCN guidelines, individuals with a second or third-degree relative with colon cancer diagnosed at any age, should start colonoscopy at age 45-50 and this should be repeated every 10 years, or per colonoscopy findings.     We also discussed her father's history of pancreatic cancer. There are currently no screening guidelines for individuals with one relative with pancreatic cancer in the absence of an inherited gene mutation. However, we discussed that pancreatic cancer screening guidelines may change in the future, therefore La should continue to discuss current screening recommendations with her physicians.     Other population cancer screening options, such as those recommended by the American Cancer Society and the National Comprehensive Cancer Network (NCCN), are also appropriate for La and her family. These screening recommendations may change if there are changes to La's personal and/or family history of cancer. Final screening recommendations should be made by each individual's managing physician.     Inheritance:  We reviewed the autosomal dominant inheritance of mutations in these genes. We discussed that La cannot pass on an identifiable mutation in these genes to any future children based on this test result. Mutations in these genes do not skip generations.      Additional Testing Considerations:  Although La's genetic testing result was negative, other relatives may still carry a gene mutation associated with an increased risk for cancer. Genetic counseling is recommended for  her sister and paternal relatives to discuss genetic testing options. If any of these relatives do pursue genetic testing, La is encouraged to contact me so that we may review the impact of their test results on her.    Summary:  We do not have an explanation for La's family history of cancer. While no genetic changes were identified, La may still be at risk for certain cancers due to family history, environmental factors, or other genetic causes not identified by this test. Because of that, it is important that she continue with cancer screening based on her personal and family history as discussed above.    Genetic testing is rapidly advancing, and new cancer susceptibility genes will most likely be identified in the future. Therefore, I encouraged La to contact me annually or if there are changes in her personal or family history. This may change how we assess her cancer risk, screening, and the testing we would offer.    Plan:  1. A copy of the test results will be mailed to La.  2. She plans to follow-up with her physicians.  3. She should contact me annually, or sooner if her family history changes.    If La has any further questions, I encouraged her to contact me at [phone].    Seble Escudero MS, [facility]  Licensed, Certified Genetic Counselor  Office: [phone], Email: [email or web address]

## 2021-06-08 NOTE — Clinical Note
Please send copy of letter to patient with test results. Please enclose test results: Send outs misc test [[id number]] (Order [id number])

## 2021-06-08 NOTE — PROGRESS NOTES
"6/8/2021    La is a 30 year old who is being evaluated via a billable telephone visit.      Phone call duration: 11 minutes    Referring Provider: Melinda Mckeon MD    Presenting Information:  I spoke to La over the phone today to discuss her genetic testing results. Testing was performed on a saliva sample collected by La at her home. The CancerNext panel was ordered from [employer]. This testing was done because of La's family history of cancer.    Genetic Testing Result: NEGATIVE  La is negative for mutations in the 36 genes analyzed: APC, JANAE, AXIN2, BARD1, BMPR1A, BRCA1, BRCA2, BRIP1, CDH1, CDK4, CDKN2A, CHEK2, DICER1, MLH1, MSH2, MSH3, MSH6, MUTYH, NBN, NF1, NTHL1, PALB2, PMS2, PTEN, RAD51C, RAD51D, RECQL, SMAD4, SMARCA4, STK11 and TP53 (sequencing and deletion/duplication); HOXB13, POLD1 and POLE (sequencing only); EPCAM and GREM1 (deletion/duplication only).    A copy of the test report can be found in the Laboratory tab, dated 5/12/21, and named \"SEND OUTS MISC TEST\". The report is scanned in as a linked document.    Interpretation:  We discussed several different interpretations of this negative test result.    1. One explanation may be that there is a different gene or combination of genes and environment that are associated with the cancers in this family.  2. It is possible that her relatives have a mutation in one of these genes and she did not inherit it.  3. There is also a small possibility that there is a mutation in one of these genes, and the testing laboratory could not find it with their current testing methods.       Screening:  Based on this negative test result, it is important for La and her relatives to refer back to the family history for appropriate cancer screening.      We discussed her paternal grandmother's history of colon or other GI cancer. It would be helpful for La to see if she can find out more about this diagnosis, as it may help " clarify her own screening recommendations. We reviewed that per current NCCN guidelines, individuals with a second or third-degree relative with colon cancer diagnosed at any age, should start colonoscopy at age 45-50 and this should be repeated every 10 years, or per colonoscopy findings.     We also discussed her father's history of pancreatic cancer. There are currently no screening guidelines for individuals with one relative with pancreatic cancer in the absence of an inherited gene mutation. However, we discussed that pancreatic cancer screening guidelines may change in the future, therefore La should continue to discuss current screening recommendations with her physicians.     Other population cancer screening options, such as those recommended by the American Cancer Society and the National Comprehensive Cancer Network (NCCN), are also appropriate for La and her family. These screening recommendations may change if there are changes to La's personal and/or family history of cancer. Final screening recommendations should be made by each individual's managing physician.     Inheritance:  We reviewed the autosomal dominant inheritance of mutations in these genes. We discussed that La cannot pass on an identifiable mutation in these genes to any future children based on this test result. Mutations in these genes do not skip generations.      Additional Testing Considerations:  Although La's genetic testing result was negative, other relatives may still carry a gene mutation associated with an increased risk for cancer. Genetic counseling is recommended for her sister and paternal relatives to discuss genetic testing options. If any of these relatives do pursue genetic testing, La is encouraged to contact me so that we may review the impact of their test results on her.    Summary:  We do not have an explanation for La's family history of cancer. While no genetic changes were identified,  La may still be at risk for certain cancers due to family history, environmental factors, or other genetic causes not identified by this test. Because of that, it is important that she continue with cancer screening based on her personal and family history as discussed above.    Genetic testing is rapidly advancing, and new cancer susceptibility genes will most likely be identified in the future. Therefore, I encouraged La to contact me annually or if there are changes in her personal or family history. This may change how we assess her cancer risk, screening, and the testing we would offer.    Plan:  1. A copy of the test results will be mailed to La.  2. She plans to follow-up with her physicians.  3. She should contact me annually, or sooner if her family history changes.    If La has any further questions, I encouraged her to contact me at [phone].    Time spent over the phone: 11 minutes.    Seble Escudero MS, [facility]  Licensed, Certified Genetic Counselor  Office: [phone]  Email: [email or web address]

## 2021-09-02 NOTE — PROGRESS NOTES
SUBJECTIVE:                                                   La Cornelius is a 31 year old female who presents to clinic today for the following health issue(s):  Patient presents with:  Fertility: patient has been trying to conceive and needs to further discuss ovulation and plans. She did have a 10 day delay in her last cycle, negative tests and then got cycle and this is the first time this has happened.      HPI:  Patient is planning for conception.  She has been trying for 6 months.  Her last cycle was abnormal.  Her last cycle was 10 days delayed.  For the last year her cycles have been every 30 days.  Pregnancy test was negative.  She didn't have any increase in cramping.  She didn't have any headaches or vomiting.    She is trying to have intercourse frequently.              Patient's last menstrual period was 2021..   Patient is sexually active, .  Using nothing for contraception, trying to conceive.   reports that she has never smoked. She has never used smokeless tobacco.  Health maintenance updated:  yes    Today's PHQ-2 Score:   PHQ-2 (  [employer]) 3/19/2021   Q1: Little interest or pleasure in doing things 0   Q2: Feeling down, depressed or hopeless 0   PHQ-2 Score 0     Today's PHQ-9 Score:   PHQ-9 SCORE 3/19/2021   PHQ-9 Total Score 0     Today's FERMIN-7 Score:   FERMIN-7 SCORE 3/19/2021   Total Score 0       Problem list and histories reviewed & adjusted, as indicated.  Additional history: as documented.    There is no problem list on file for this patient.    Past Surgical History:   Procedure Laterality Date     NO HISTORY OF SURGERY        Social History     Tobacco Use     Smoking status: Never Smoker     Smokeless tobacco: Never Used   Substance Use Topics     Alcohol use: Not on file      Problem (# of Occurrences) Relation (Name,Age of Onset)    Diabetes (1) Maternal Grandfather    Heart Disease (1) Maternal Grandfather    Pancreatic Cancer (1) Father (45)    Thyroid Disease  "(2) Mother, Maternal Grandmother            Current Outpatient Medications   Medication Sig     Prenatal Vit-Fe Fumarate-FA (PRENATAL VITAMINS PO)      No current facility-administered medications for this visit.     No Known Allergies    ROS:  12 point review of systems negative other than symptoms noted below or in the HPI.  No urinary frequency or dysuria, bladder or kidney problems      OBJECTIVE:     BP 92/62   Ht 1.638 m (5' 4.5\")   Wt 54.4 kg (120 lb)   LMP 08/11/2021   BMI 20.28 kg/m    Body mass index is 20.28 kg/m .    Exam:  Constitutional:  Appearance: Well nourished, well developed alert, in no acute distress     In-Clinic Test Results:  No results found for this or any previous visit (from the past 24 hour(s)).    ASSESSMENT/PLAN:                                                        ICD-10-CM    1. Pre-conception counseling  Z31.69 US Transvaginal Non OB     Prolactin     Anti-Mullerian hormone     TSH with free T4 reflex     Follicle stimulating hormone     Lutropin     Estradiol   2. Abnormal uterine bleeding  N93.9 HCG quantitative pregnancy         1. Will initially start with some labs today  2. Can schedule ovarian function testing with her next cycle, discussed could come in days 2-5 of next cycle (FSH, LH and estradiol)  3. HCG today due to the abnormal cycle  4. US for evaluation   5. Discussed timed intercourse and every other day during the interval.  Day 6ish-day 22, due to differences in ovulation.    6. Could get some initial tests and they will likely try on their own following that    Melinda Mckeon MD  [facility] [place]  "

## 2021-09-03 ENCOUNTER — OFFICE VISIT (OUTPATIENT)
Dept: OBGYN | Facility: CLINIC | Age: 31
End: 2021-09-03
Payer: COMMERCIAL

## 2021-09-03 VITALS
DIASTOLIC BLOOD PRESSURE: 62 MMHG | BODY MASS INDEX: 19.99 KG/M2 | SYSTOLIC BLOOD PRESSURE: 92 MMHG | HEIGHT: 65 IN | WEIGHT: 120 LBS

## 2021-09-03 DIAGNOSIS — N93.9 ABNORMAL UTERINE BLEEDING: ICD-10-CM

## 2021-09-03 DIAGNOSIS — Z31.69 PRE-CONCEPTION COUNSELING: Primary | ICD-10-CM

## 2021-09-03 LAB
ESTRADIOL SERPL-MCNC: 154 PG/ML
FSH SERPL-ACNC: 2.2 IU/L
LH SERPL-ACNC: 6.1 IU/L
PROLACTIN SERPL-MCNC: 14 UG/L (ref 3–27)

## 2021-09-03 PROCEDURE — 84702 CHORIONIC GONADOTROPIN TEST: CPT | Performed by: OBSTETRICS & GYNECOLOGY

## 2021-09-03 PROCEDURE — 99213 OFFICE O/P EST LOW 20 MIN: CPT | Performed by: OBSTETRICS & GYNECOLOGY

## 2021-09-03 PROCEDURE — 83520 IMMUNOASSAY QUANT NOS NONAB: CPT | Mod: 90 | Performed by: OBSTETRICS & GYNECOLOGY

## 2021-09-03 PROCEDURE — 84146 ASSAY OF PROLACTIN: CPT | Performed by: OBSTETRICS & GYNECOLOGY

## 2021-09-03 PROCEDURE — 36415 COLL VENOUS BLD VENIPUNCTURE: CPT | Performed by: OBSTETRICS & GYNECOLOGY

## 2021-09-03 PROCEDURE — 83002 ASSAY OF GONADOTROPIN (LH): CPT | Performed by: OBSTETRICS & GYNECOLOGY

## 2021-09-03 PROCEDURE — 82670 ASSAY OF TOTAL ESTRADIOL: CPT | Performed by: OBSTETRICS & GYNECOLOGY

## 2021-09-03 PROCEDURE — 83001 ASSAY OF GONADOTROPIN (FSH): CPT | Performed by: OBSTETRICS & GYNECOLOGY

## 2021-09-03 PROCEDURE — 99000 SPECIMEN HANDLING OFFICE-LAB: CPT | Performed by: OBSTETRICS & GYNECOLOGY

## 2021-09-03 PROCEDURE — 84443 ASSAY THYROID STIM HORMONE: CPT | Performed by: OBSTETRICS & GYNECOLOGY

## 2021-09-03 ASSESSMENT — MIFFLIN-ST. JEOR: SCORE: 1257.26

## 2021-09-04 LAB
B-HCG SERPL-ACNC: <1 IU/L (ref 0–5)
TSH SERPL DL<=0.005 MIU/L-ACNC: 2.39 MU/L (ref 0.4–4)

## 2021-09-06 LAB — MIS SERPL-MCNC: 5.39 NG/ML

## 2021-09-07 NOTE — RESULT ENCOUNTER NOTE
Your labs overall look ok.  Your FSH was a little low, estradiol a little high.  This likely means nothing, however, we should repeat the ovarian function testing in 2 months on day 2-5, aka day 3.  This is just to see if there are any trends.  Her AMH and other labs are totally normal.    Repeat day 3 (FSH, LH, estradiol)

## 2021-10-11 ENCOUNTER — HEALTH MAINTENANCE LETTER (OUTPATIENT)
Age: 31
End: 2021-10-11

## 2021-10-19 NOTE — PROGRESS NOTES
SUBJECTIVE:                                                   La Cornelius is a 31 year old female who presents to clinic today for the following health issue(s):  Patient presents with:  Ultrasound      HPI:  Patient is planning on pregnancy.  Her ultrasound was done today and was normal.  She had a normal last cycle.  No dysmenorrhea.     Patient's last menstrual period was 10/14/2021..     Patient is sexually active, .  Using none for contraception.    reports that she has never smoked. She has never used smokeless tobacco.    STD testing offered?  Declined    Health maintenance updated:  yes    Today's PHQ-2 Score:   PHQ-2 (  [employer]) 3/19/2021   Q1: Little interest or pleasure in doing things 0   Q2: Feeling down, depressed or hopeless 0   PHQ-2 Score 0     Problem list and histories reviewed & adjusted, as indicated.  Additional history: as documented.    There is no problem list on file for this patient.    Past Surgical History:   Procedure Laterality Date     NO HISTORY OF SURGERY        Social History     Tobacco Use     Smoking status: Never Smoker     Smokeless tobacco: Never Used   Substance Use Topics     Alcohol use: Not on file      Problem (# of Occurrences) Relation (Name,Age of Onset)    Diabetes (1) Maternal Grandfather    Heart Disease (1) Maternal Grandfather    Pancreatic Cancer (1) Father (45)    Thyroid Disease (2) Mother, Maternal Grandmother            Current Outpatient Medications   Medication Sig     Prenatal Vit-Fe Fumarate-FA (PRENATAL VITAMINS PO)      No current facility-administered medications for this visit.     No Known Allergies    ROS:  12 point review of systems negative other than symptoms noted below or in the HPI.  No urinary frequency or dysuria, bladder or kidney problems      OBJECTIVE:     /62   Wt 55.3 kg (122 lb)   LMP 10/14/2021   Breastfeeding No   BMI 20.62 kg/m    Body mass index is 20.62 kg/m .    Exam:  Constitutional:  Appearance:  Well nourished, well developed alert, in no acute distress     In-Clinic Test Results:  Results for orders placed or performed in visit on 09/03/21 (from the past 24 hour(s))   US Transvaginal Non OB    Narrative    Gynecological Ultrasound Report  Pelvic U/S - Transvaginal  [facility]  Referring Provider: Melinda Soto MD  Sonographer:  Stephanie Tejeda RDMS  Indication: Pre conception counseling  LMP: 10/14/21  History:   Gynecological Ultrasonography:   Uterus: anteverted. Contour is smooth/regular.  Size: 5.40 x 3.76 x 2.67 cm  Endometrium: Thickness Total 6.13 mm  Findings: Trilaminar   Right Ovary: 3.13 x 2.42 x 1.70 cm.   Left Ovary: 3.01 x 2.16 x 1.61 cm. Wnl  Cul de Sac Free Fluid: No free fluid     Impression:       Currently day 6 of cycle.  Ultrasound reveals anatomy consistent with   what would be expected at this time of her cycle.  No abnormal findings   noted.    Melinda Mckeon MD       ASSESSMENT/PLAN:                                                        ICD-10-CM    1. Pre-conception counseling  Z31.69          1. Will repeat ovarian function testing next month.  Her FSH was slightly low.  Would like another look at FSH/LH and estradiol between days 2-5 of cycle.   Goal to have drawn day 3 but in the window would be optimal.  2. Discussed her ultrasound results, no anatomic concerns seen  3. She will call if she wants any other investigation.    Melinda Mckeon MD  [facility] [place]

## 2021-10-20 ENCOUNTER — OFFICE VISIT (OUTPATIENT)
Dept: OBGYN | Facility: CLINIC | Age: 31
End: 2021-10-20
Attending: OBSTETRICS & GYNECOLOGY
Payer: COMMERCIAL

## 2021-10-20 ENCOUNTER — ANCILLARY PROCEDURE (OUTPATIENT)
Dept: ULTRASOUND IMAGING | Facility: CLINIC | Age: 31
End: 2021-10-20
Attending: OBSTETRICS & GYNECOLOGY
Payer: COMMERCIAL

## 2021-10-20 VITALS — WEIGHT: 122 LBS | BODY MASS INDEX: 20.62 KG/M2 | DIASTOLIC BLOOD PRESSURE: 62 MMHG | SYSTOLIC BLOOD PRESSURE: 110 MMHG

## 2021-10-20 DIAGNOSIS — Z31.69 PRE-CONCEPTION COUNSELING: Primary | ICD-10-CM

## 2021-10-20 PROCEDURE — 99213 OFFICE O/P EST LOW 20 MIN: CPT | Performed by: OBSTETRICS & GYNECOLOGY

## 2021-10-20 PROCEDURE — 76830 TRANSVAGINAL US NON-OB: CPT | Performed by: OBSTETRICS & GYNECOLOGY

## 2021-11-17 ENCOUNTER — LAB (OUTPATIENT)
Dept: LAB | Facility: CLINIC | Age: 31
End: 2021-11-17
Payer: COMMERCIAL

## 2021-11-17 DIAGNOSIS — Z31.69 PRE-CONCEPTION COUNSELING: ICD-10-CM

## 2021-11-17 LAB
ESTRADIOL SERPL-MCNC: 29 PG/ML
FSH SERPL-ACNC: 6.1 IU/L
LH SERPL-ACNC: 5 IU/L

## 2021-11-17 PROCEDURE — 83002 ASSAY OF GONADOTROPIN (LH): CPT

## 2021-11-17 PROCEDURE — 83001 ASSAY OF GONADOTROPIN (FSH): CPT

## 2021-11-17 PROCEDURE — 82670 ASSAY OF TOTAL ESTRADIOL: CPT

## 2021-11-17 PROCEDURE — 36415 COLL VENOUS BLD VENIPUNCTURE: CPT

## 2022-05-10 ENCOUNTER — PRENATAL OFFICE VISIT (OUTPATIENT)
Dept: NURSING | Facility: CLINIC | Age: 32
End: 2022-05-10
Payer: COMMERCIAL

## 2022-05-10 DIAGNOSIS — Z13.79 GENETIC SCREENING: ICD-10-CM

## 2022-05-10 DIAGNOSIS — Z34.81 SUPERVISION OF NORMAL INTRAUTERINE PREGNANCY IN MULTIGRAVIDA IN FIRST TRIMESTER: ICD-10-CM

## 2022-05-10 DIAGNOSIS — O36.80X0 PREGNANCY WITH INCONCLUSIVE FETAL VIABILITY: Primary | ICD-10-CM

## 2022-05-10 PROBLEM — Z34.80 SUPERVISION OF NORMAL INTRAUTERINE PREGNANCY IN MULTIGRAVIDA: Status: ACTIVE | Noted: 2022-05-10

## 2022-05-10 PROCEDURE — 99207 PR NO CHARGE NURSE ONLY: CPT

## 2022-05-10 NOTE — PROGRESS NOTES
SUBJECTIVE:     HPI:    This is a 31 year old female patient,  who presents for her first obstetrical visit.    ROBI: 2022, by Last Menstrual Period.  She is 8w1d weeks.  Her cycles are regular.  Her last menstrual period was normal.   Since her LMP, she has experienced  nausea, fatigue and headache).   She denies emesis, abdominal pain, loss of appetite, vaginal discharge, dysuria, pelvic pain, urinary urgency, lightheadedness, urinary frequency, vaginal bleeding, hemorrhoids and constipation.    Additional History: First Pregnancy    Have you travelled during the pregnancy?No  Have your sexual partner(s) travelled during the pregnancy?No   Pt and spouse traveling out of state in the first trimester    HISTORY:   Planned Pregnancy: Yes  Marital Status:   Occupation: , working from home  Living in Household: Spouse    Past History:  Her past medical history   Past Medical History:   Diagnosis Date     Known health problems: none    .      She has a history of  first pregnancy    Since her last LMP she denies use of alcohol, tobacco and street drugs.    Past medical, surgical, social and family history were reviewed and updated in [facility].      Current Outpatient Medications   Medication     calcium-magnesium (CALMAG) 500-250 MG TABS per tablet     Docosahexaenoic Acid (DHA) 200 MG capsule     Prenatal Vit-Fe Fumarate-FA (PRENATAL VITAMINS PO)     No current facility-administered medications for this visit.       ROS:   12 point review of systems negative other than symptoms noted below or in the HPI.  Constitutional: Fatigue  Head: headaches  Gastrointestinal: Nausea      OBJECTIVE:     EXAM:  LMP 2022  There is no height or weight on file to calculate BMI.      ASSESSMENT/PLAN:     No diagnosis found.    31 year old , 8w1d weeks of pregnancy with ROBI of 2022, by Last Menstrual Period    Nurse phone visit completed. Prenatal book and folder (containing standard  educational hand-outs and brochures) will be given at next visit to patient. Information in folder reviewed over the phone. Questions answered. Brochure given on optional screening available to assess chromosomal anomalies. Pt desires NIPS. Pt advised to call the clinic if she has any questions or concerns related to her pregnancy. Prenatal labs future ordered. New prenatal visit scheduled on 5/19 with Yolande Patino RN on 5/10/2022 at 9:37 AM  .      Lab Results   Component Value Date    PAP NIL 03/19/2021       Patient supplied answers from flow sheet for:  Prenatal OB Questionnaire.  Past Medical History  Have you ever recieved care for your mental health? : No  Have you ever been in a major accident or suffered serious trauma?: No  Within the last year, has anyone hit, slapped, kicked or otherwise hurt you?: No  In the last year, has anyone forced you to have sex when you didn't want to?: No    Past Medical History 2   Have you ever received a blood transfusion?: No  Would you accept a blood transfusion if was medically recommended?: Yes  Does anyone in your home smoke?: No   Is your blood type Rh negative?: Unknown  Have you ever ?: No  Have you been hospitalized for a nonsurgical reason excluding normal delivery?: No  Have you ever had an abnormal pap smear?: No    Past Medical History (Continued)  Do you have a history of abnormalities of the uterus?: No  Did your mother take CHARLES or any other hormones when she was pregnant with you?: No  Do you have any other problems we have not asked about which you feel may be important to this pregnancy?: No

## 2022-05-17 NOTE — PROGRESS NOTES
SUBJECTIVE:      HPI:     This is a 31 year old female patient,  who presents for her first obstetrical visit.     ROBI: 2022, by Last Menstrual Period.  She is 9w3d weeks.  Her cycles are regular.  Her last menstrual period was normal.   Since her LMP, she has experienced  nausea, fatigue and headache).   She denies emesis, abdominal pain, loss of appetite, vaginal discharge, dysuria, pelvic pain, urinary urgency, lightheadedness, urinary frequency, vaginal bleeding, hemorrhoids and constipation.     Additional History: This is her first pregnancy. Doing well. Having lower abdominal pain with sneezing. Having nasal congestion. Received the COVID vaccine X 3 doses and interested in another booster. Interested in aneuploidy screening. Working from home as a .      Have you travelled during the pregnancy?No  Have your sexual partner(s) travelled during the pregnancy?No   Pt and spouse traveling to [place] in the first trimester     HISTORY:   Planned Pregnancy: Yes  Marital Status:   Occupation: , working from home  Living in Household: Spouse     Past History:  Her past medical history   Past Medical History        Past Medical History:   Diagnosis Date     Known health problems: none        .       She has a history of  first pregnancy     Since her last LMP she denies use of alcohol, tobacco and street drugs.     Past medical, surgical, social and family history were reviewed and updated in EPIC.            Current Outpatient Medications   Medication     calcium-magnesium (CALMAG) 500-250 MG TABS per tablet     Docosahexaenoic Acid (DHA) 200 MG capsule     Prenatal Vit-Fe Fumarate-FA (PRENATAL VITAMINS PO)      No current facility-administered medications for this visit.         ROS:   12 point review of systems negative other than symptoms noted below or in the HPI.  Constitutional: Fatigue  Head: headaches  Gastrointestinal: Nausea      OBJECTIVE:      EXAM:  BP (!) 82/56   Wt 59.6 kg (131 lb 6.4 oz)   LMP 2022   Breastfeeding No   BMI 22.21 kg/m   Body mass index is 22.21 kg/m .    GENERAL: healthy, alert and no distress  EYES: Eyes grossly normal to inspection, PERRL and conjunctivae and sclerae normal  NECK: no adenopathy, no asymmetry, masses, or scars and thyroid normal to palpation  RESP: lungs clear to auscultation - no rales, rhonchi or wheezes  BREAST: normal without masses, tenderness or nipple discharge and no palpable axillary masses or adenopathy  CV: regular rate and rhythm, normal S1 S2, no S3 or S4, no murmur, click or rub, no peripheral edema and peripheral pulses strong  ABDOMEN: soft, nontender, no hepatosplenomegaly, no masses and bowel sounds normal  MS: no gross musculoskeletal defects noted, no edema  SKIN: no suspicious lesions or rashes  NEURO: Normal strength and tone, mentation intact and speech normal  PSYCH: mentation appears normal, affect normal/bright    ASSESSMENT/PLAN:       ICD-10-CM    1. Screen for STD (sexually transmitted disease)  Z11.3 NEISSERIA GONORRHOEA PCR   2. Supervision of normal intrauterine pregnancy in multigravida in first trimester  Z34.81 Urine Culture Aerobic Bacterial     *UA reflex to Microscopic   3. Screening for chlamydial disease  Z11.8 CHLAMYDIA TRACHOMATIS PCR       31 year old , On May 17, 2022 patient is 9w3d weeks of pregnancy with ROBI of 2022, by Last Menstrual Period consistent with her sonogram today    Discussed as follows:Aneuploidy screening discussed. All options discussed settling for cfDNA and AFP. First trimester counseling. Not a candidate for ASA. Weight gain in pregnancy. Activity recommended. Will plan to return for her labs. Discussed travel safety with rising cases. COVID booster recommended when she returns.    Counseling given:   - Follow up in 4 weeks for return OB visit.    Geno Lanier MD

## 2022-05-19 ENCOUNTER — PRENATAL OFFICE VISIT (OUTPATIENT)
Dept: OBGYN | Facility: CLINIC | Age: 32
End: 2022-05-19
Payer: COMMERCIAL

## 2022-05-19 ENCOUNTER — ANCILLARY PROCEDURE (OUTPATIENT)
Dept: ULTRASOUND IMAGING | Facility: CLINIC | Age: 32
End: 2022-05-19
Payer: COMMERCIAL

## 2022-05-19 VITALS — SYSTOLIC BLOOD PRESSURE: 82 MMHG | BODY MASS INDEX: 22.21 KG/M2 | WEIGHT: 131.4 LBS | DIASTOLIC BLOOD PRESSURE: 56 MMHG

## 2022-05-19 DIAGNOSIS — Z34.81 SUPERVISION OF NORMAL INTRAUTERINE PREGNANCY IN MULTIGRAVIDA IN FIRST TRIMESTER: ICD-10-CM

## 2022-05-19 DIAGNOSIS — Z11.3 SCREEN FOR STD (SEXUALLY TRANSMITTED DISEASE): Primary | ICD-10-CM

## 2022-05-19 DIAGNOSIS — Z11.8 SCREENING FOR CHLAMYDIAL DISEASE: ICD-10-CM

## 2022-05-19 LAB
ALBUMIN UR-MCNC: NEGATIVE MG/DL
APPEARANCE UR: CLEAR
BILIRUB UR QL STRIP: NEGATIVE
COLOR UR AUTO: YELLOW
GLUCOSE UR STRIP-MCNC: NEGATIVE MG/DL
HGB UR QL STRIP: NEGATIVE
KETONES UR STRIP-MCNC: NEGATIVE MG/DL
LEUKOCYTE ESTERASE UR QL STRIP: NEGATIVE
NITRATE UR QL: NEGATIVE
PH UR STRIP: 6.5 [PH] (ref 5–7)
SP GR UR STRIP: 1.01 (ref 1–1.03)
UROBILINOGEN UR STRIP-ACNC: 0.2 E.U./DL

## 2022-05-19 PROCEDURE — 99207 PR FIRST OB VISIT: CPT | Performed by: OBSTETRICS & GYNECOLOGY

## 2022-05-19 PROCEDURE — 87591 N.GONORRHOEAE DNA AMP PROB: CPT | Performed by: OBSTETRICS & GYNECOLOGY

## 2022-05-19 PROCEDURE — 87086 URINE CULTURE/COLONY COUNT: CPT | Performed by: OBSTETRICS & GYNECOLOGY

## 2022-05-19 PROCEDURE — 81003 URINALYSIS AUTO W/O SCOPE: CPT | Performed by: OBSTETRICS & GYNECOLOGY

## 2022-05-19 PROCEDURE — 87491 CHLMYD TRACH DNA AMP PROBE: CPT | Performed by: OBSTETRICS & GYNECOLOGY

## 2022-05-19 PROCEDURE — 76801 OB US < 14 WKS SINGLE FETUS: CPT | Performed by: OBSTETRICS & GYNECOLOGY

## 2022-05-20 LAB
BACTERIA UR CULT: NO GROWTH
C TRACH DNA SPEC QL NAA+PROBE: NEGATIVE
N GONORRHOEA DNA SPEC QL NAA+PROBE: NEGATIVE

## 2022-05-22 ENCOUNTER — HEALTH MAINTENANCE LETTER (OUTPATIENT)
Age: 32
End: 2022-05-22

## 2022-06-16 ENCOUNTER — PRENATAL OFFICE VISIT (OUTPATIENT)
Dept: OBGYN | Facility: CLINIC | Age: 32
End: 2022-06-16
Payer: COMMERCIAL

## 2022-06-16 VITALS — WEIGHT: 130.4 LBS | BODY MASS INDEX: 22.04 KG/M2 | SYSTOLIC BLOOD PRESSURE: 95 MMHG | DIASTOLIC BLOOD PRESSURE: 62 MMHG

## 2022-06-16 DIAGNOSIS — Z13.79 GENETIC SCREENING: ICD-10-CM

## 2022-06-16 DIAGNOSIS — Z34.81 SUPERVISION OF NORMAL INTRAUTERINE PREGNANCY IN MULTIGRAVIDA IN FIRST TRIMESTER: Primary | ICD-10-CM

## 2022-06-16 DIAGNOSIS — Z23 HIGH PRIORITY FOR 2019-NCOV VACCINE: ICD-10-CM

## 2022-06-16 LAB
ABO/RH(D): NORMAL
ANTIBODY SCREEN: NEGATIVE
ERYTHROCYTE [DISTWIDTH] IN BLOOD BY AUTOMATED COUNT: 12 % (ref 10–15)
HCT VFR BLD AUTO: 36.8 % (ref 35–47)
HGB BLD-MCNC: 12.8 G/DL (ref 11.7–15.7)
MCH RBC QN AUTO: 31.7 PG (ref 26.5–33)
MCHC RBC AUTO-ENTMCNC: 34.8 G/DL (ref 31.5–36.5)
MCV RBC AUTO: 91 FL (ref 78–100)
PLATELET # BLD AUTO: 190 10E3/UL (ref 150–450)
RBC # BLD AUTO: 4.04 10E6/UL (ref 3.8–5.2)
SPECIMEN EXPIRATION DATE: NORMAL
WBC # BLD AUTO: 8.2 10E3/UL (ref 4–11)

## 2022-06-16 PROCEDURE — 87389 HIV-1 AG W/HIV-1&-2 AB AG IA: CPT | Performed by: OBSTETRICS & GYNECOLOGY

## 2022-06-16 PROCEDURE — 87340 HEPATITIS B SURFACE AG IA: CPT | Performed by: OBSTETRICS & GYNECOLOGY

## 2022-06-16 PROCEDURE — 86901 BLOOD TYPING SEROLOGIC RH(D): CPT | Performed by: OBSTETRICS & GYNECOLOGY

## 2022-06-16 PROCEDURE — 86900 BLOOD TYPING SEROLOGIC ABO: CPT | Performed by: OBSTETRICS & GYNECOLOGY

## 2022-06-16 PROCEDURE — 86850 RBC ANTIBODY SCREEN: CPT | Performed by: OBSTETRICS & GYNECOLOGY

## 2022-06-16 PROCEDURE — 91305 COVID-19,PF,PFIZER (12+ YRS): CPT | Performed by: OBSTETRICS & GYNECOLOGY

## 2022-06-16 PROCEDURE — 86803 HEPATITIS C AB TEST: CPT | Performed by: OBSTETRICS & GYNECOLOGY

## 2022-06-16 PROCEDURE — 0054A COVID-19,PF,PFIZER (12+ YRS): CPT | Performed by: OBSTETRICS & GYNECOLOGY

## 2022-06-16 PROCEDURE — 86780 TREPONEMA PALLIDUM: CPT | Performed by: OBSTETRICS & GYNECOLOGY

## 2022-06-16 PROCEDURE — 85027 COMPLETE CBC AUTOMATED: CPT | Performed by: OBSTETRICS & GYNECOLOGY

## 2022-06-16 PROCEDURE — 86762 RUBELLA ANTIBODY: CPT | Performed by: OBSTETRICS & GYNECOLOGY

## 2022-06-16 PROCEDURE — 36415 COLL VENOUS BLD VENIPUNCTURE: CPT | Performed by: OBSTETRICS & GYNECOLOGY

## 2022-06-16 PROCEDURE — 99207 PR PRENATAL VISIT: CPT | Performed by: OBSTETRICS & GYNECOLOGY

## 2022-06-16 NOTE — PROGRESS NOTES
Prenatal visit: having right sided sciatic pain. Recommend support pillow. Interested in getting a booster today. Will do Moderna booster today. Prenatal labs and [facility] cfDNA testing today as well.  RTC in 3 weeks  Geno Lanier MD

## 2022-06-17 LAB
HBV SURFACE AG SERPL QL IA: NONREACTIVE
HCV AB SERPL QL IA: NONREACTIVE
HIV 1+2 AB+HIV1 P24 AG SERPL QL IA: NONREACTIVE
RUBV IGG SERPL QL IA: 1.26 INDEX
RUBV IGG SERPL QL IA: POSITIVE
T PALLIDUM AB SER QL: NONREACTIVE

## 2022-06-22 LAB — SCANNED LAB RESULT: NORMAL

## 2022-07-07 ENCOUNTER — PRENATAL OFFICE VISIT (OUTPATIENT)
Dept: OBGYN | Facility: CLINIC | Age: 32
End: 2022-07-07
Payer: COMMERCIAL

## 2022-07-07 VITALS — DIASTOLIC BLOOD PRESSURE: 62 MMHG | BODY MASS INDEX: 22.71 KG/M2 | WEIGHT: 134.4 LBS | SYSTOLIC BLOOD PRESSURE: 98 MMHG

## 2022-07-07 DIAGNOSIS — Z36.1 ENCOUNTER FOR ANTENATAL SCREENING FOR RAISED ALPHAFETOPROTEIN LEVEL: ICD-10-CM

## 2022-07-07 DIAGNOSIS — Z34.81 SUPERVISION OF NORMAL INTRAUTERINE PREGNANCY IN MULTIGRAVIDA IN FIRST TRIMESTER: Primary | ICD-10-CM

## 2022-07-07 PROCEDURE — 36415 COLL VENOUS BLD VENIPUNCTURE: CPT | Performed by: OBSTETRICS & GYNECOLOGY

## 2022-07-07 PROCEDURE — 82105 ALPHA-FETOPROTEIN SERUM: CPT | Mod: 90 | Performed by: OBSTETRICS & GYNECOLOGY

## 2022-07-07 PROCEDURE — 99000 SPECIMEN HANDLING OFFICE-LAB: CPT | Performed by: OBSTETRICS & GYNECOLOGY

## 2022-07-07 PROCEDURE — 99207 PR PRENATAL VISIT: CPT | Performed by: OBSTETRICS & GYNECOLOGY

## 2022-07-07 NOTE — PROGRESS NOTES
Prenatal Visit: Doing well overall. Discussed sleeping strategies. AFP today reviewed labs so far. Anatomical survey in 4 weeks  Geno Lanier MD

## 2022-07-09 LAB
# FETUSES US: NORMAL
AFP MOM SERPL: 1.2
AFP SERPL-MCNC: 47 NG/ML
AGE - REPORTED: 31.4 YR
CURRENT SMOKER: NO
FAMILY MEMBER DISEASES HX: NO
GA METHOD: NORMAL
GA: NORMAL WK
IDDM PATIENT QL: NO
INTEGRATED SCN PATIENT-IMP: NORMAL
SPECIMEN DRAWN SERPL: NORMAL

## 2022-08-08 ENCOUNTER — ANCILLARY PROCEDURE (OUTPATIENT)
Dept: ULTRASOUND IMAGING | Facility: CLINIC | Age: 32
End: 2022-08-08
Attending: OBSTETRICS & GYNECOLOGY
Payer: COMMERCIAL

## 2022-08-08 ENCOUNTER — PRENATAL OFFICE VISIT (OUTPATIENT)
Dept: OBGYN | Facility: CLINIC | Age: 32
End: 2022-08-08
Attending: OBSTETRICS & GYNECOLOGY

## 2022-08-08 VITALS — WEIGHT: 139.8 LBS | SYSTOLIC BLOOD PRESSURE: 98 MMHG | DIASTOLIC BLOOD PRESSURE: 60 MMHG | BODY MASS INDEX: 23.63 KG/M2

## 2022-08-08 DIAGNOSIS — Z34.82 SUPERVISION OF NORMAL INTRAUTERINE PREGNANCY IN MULTIGRAVIDA IN SECOND TRIMESTER: Primary | ICD-10-CM

## 2022-08-08 DIAGNOSIS — Z34.81 SUPERVISION OF NORMAL INTRAUTERINE PREGNANCY IN MULTIGRAVIDA IN FIRST TRIMESTER: ICD-10-CM

## 2022-08-08 PROCEDURE — 99207 PR PRENATAL VISIT: CPT | Performed by: OBSTETRICS & GYNECOLOGY

## 2022-08-08 PROCEDURE — 76805 OB US >/= 14 WKS SNGL FETUS: CPT | Performed by: OBSTETRICS & GYNECOLOGY

## 2022-08-29 ENCOUNTER — PRENATAL OFFICE VISIT (OUTPATIENT)
Dept: OBGYN | Facility: CLINIC | Age: 32
End: 2022-08-29
Payer: COMMERCIAL

## 2022-08-29 VITALS — DIASTOLIC BLOOD PRESSURE: 62 MMHG | SYSTOLIC BLOOD PRESSURE: 98 MMHG | BODY MASS INDEX: 24.4 KG/M2 | WEIGHT: 144.4 LBS

## 2022-08-29 DIAGNOSIS — Z34.82 SUPERVISION OF NORMAL INTRAUTERINE PREGNANCY IN MULTIGRAVIDA IN SECOND TRIMESTER: Primary | ICD-10-CM

## 2022-08-29 PROCEDURE — 99207 PR PRENATAL VISIT: CPT | Performed by: OBSTETRICS & GYNECOLOGY

## 2022-08-29 NOTE — PROGRESS NOTES
Prenatal Visit: Having more fatigue and easy tiredness. Able to sleep well at night. Feeling fetal movement. No fibroid seen at her 20 week sonogram. Will check a third trimester growth. Discussed upcoming glucola  RTC in 4 weeks  Geno Lanier MD

## 2022-09-22 DIAGNOSIS — Z23 NEED FOR TDAP VACCINATION: ICD-10-CM

## 2022-09-22 DIAGNOSIS — Z34.83 SUPERVISION OF NORMAL INTRAUTERINE PREGNANCY IN MULTIGRAVIDA IN THIRD TRIMESTER: Primary | ICD-10-CM

## 2022-09-25 ENCOUNTER — HEALTH MAINTENANCE LETTER (OUTPATIENT)
Age: 32
End: 2022-09-25

## 2022-09-29 ENCOUNTER — PRENATAL OFFICE VISIT (OUTPATIENT)
Dept: OBGYN | Facility: CLINIC | Age: 32
End: 2022-09-29
Attending: OBSTETRICS & GYNECOLOGY
Payer: COMMERCIAL

## 2022-09-29 ENCOUNTER — LAB (OUTPATIENT)
Dept: LAB | Facility: CLINIC | Age: 32
End: 2022-09-29
Attending: OBSTETRICS & GYNECOLOGY

## 2022-09-29 VITALS
WEIGHT: 149.8 LBS | BODY MASS INDEX: 24.08 KG/M2 | SYSTOLIC BLOOD PRESSURE: 100 MMHG | DIASTOLIC BLOOD PRESSURE: 56 MMHG | HEIGHT: 66 IN

## 2022-09-29 DIAGNOSIS — Z23 NEED FOR TDAP VACCINATION: ICD-10-CM

## 2022-09-29 DIAGNOSIS — Z34.83 SUPERVISION OF NORMAL INTRAUTERINE PREGNANCY IN MULTIGRAVIDA IN THIRD TRIMESTER: Primary | ICD-10-CM

## 2022-09-29 DIAGNOSIS — Z34.83 SUPERVISION OF NORMAL INTRAUTERINE PREGNANCY IN MULTIGRAVIDA IN THIRD TRIMESTER: ICD-10-CM

## 2022-09-29 LAB
ERYTHROCYTE [DISTWIDTH] IN BLOOD BY AUTOMATED COUNT: 12.3 % (ref 10–15)
GLUCOSE 1H P 50 G GLC PO SERPL-MCNC: 86 MG/DL (ref 70–129)
HCT VFR BLD AUTO: 36 % (ref 35–47)
HGB BLD-MCNC: 12 G/DL (ref 11.7–15.7)
MCH RBC QN AUTO: 31.7 PG (ref 26.5–33)
MCHC RBC AUTO-ENTMCNC: 33.3 G/DL (ref 31.5–36.5)
MCV RBC AUTO: 95 FL (ref 78–100)
PLATELET # BLD AUTO: 186 10E3/UL (ref 150–450)
RBC # BLD AUTO: 3.78 10E6/UL (ref 3.8–5.2)
WBC # BLD AUTO: 13.7 10E3/UL (ref 4–11)

## 2022-09-29 PROCEDURE — 82950 GLUCOSE TEST: CPT

## 2022-09-29 PROCEDURE — 85027 COMPLETE CBC AUTOMATED: CPT

## 2022-09-29 PROCEDURE — 90715 TDAP VACCINE 7 YRS/> IM: CPT | Performed by: OBSTETRICS & GYNECOLOGY

## 2022-09-29 PROCEDURE — 90471 IMMUNIZATION ADMIN: CPT | Performed by: OBSTETRICS & GYNECOLOGY

## 2022-09-29 PROCEDURE — 99207 PR PRENATAL VISIT: CPT | Performed by: OBSTETRICS & GYNECOLOGY

## 2022-09-29 PROCEDURE — 36415 COLL VENOUS BLD VENIPUNCTURE: CPT

## 2022-09-29 PROCEDURE — 84443 ASSAY THYROID STIM HORMONE: CPT

## 2022-09-29 PROCEDURE — 82728 ASSAY OF FERRITIN: CPT

## 2022-09-29 NOTE — PROGRESS NOTES
Prenatal Visit: doing well overall but having more fatigue in the AM and in the PM as well. She feel better after eating but starts to feel tired by 5 pm. Could be normal fatigue with pregnancy but will rule out anemia and thyroid disease. Normal growth sonogram today.  RTC in 2 weeks  Geno Lanier MD

## 2022-09-30 LAB
FERRITIN SERPL-MCNC: 13 NG/ML (ref 12–150)
TSH SERPL DL<=0.005 MIU/L-ACNC: 1.27 MU/L (ref 0.4–4)

## 2022-10-10 ENCOUNTER — PRENATAL OFFICE VISIT (OUTPATIENT)
Dept: OBGYN | Facility: CLINIC | Age: 32
End: 2022-10-10
Payer: COMMERCIAL

## 2022-10-10 VITALS — BODY MASS INDEX: 24.53 KG/M2 | WEIGHT: 152 LBS | DIASTOLIC BLOOD PRESSURE: 60 MMHG | SYSTOLIC BLOOD PRESSURE: 102 MMHG

## 2022-10-10 DIAGNOSIS — Z34.83 SUPERVISION OF NORMAL INTRAUTERINE PREGNANCY IN MULTIGRAVIDA IN THIRD TRIMESTER: Primary | ICD-10-CM

## 2022-10-10 PROCEDURE — 99207 PR PRENATAL VISIT: CPT | Performed by: OBSTETRICS & GYNECOLOGY

## 2022-10-24 ENCOUNTER — PRENATAL OFFICE VISIT (OUTPATIENT)
Dept: OBGYN | Facility: CLINIC | Age: 32
End: 2022-10-24
Payer: COMMERCIAL

## 2022-10-24 VITALS — BODY MASS INDEX: 24.86 KG/M2 | SYSTOLIC BLOOD PRESSURE: 112 MMHG | DIASTOLIC BLOOD PRESSURE: 60 MMHG | WEIGHT: 154 LBS

## 2022-10-24 DIAGNOSIS — Z23 HIGH PRIORITY FOR 2019-NCOV VACCINE: ICD-10-CM

## 2022-10-24 DIAGNOSIS — Z34.83 SUPERVISION OF NORMAL INTRAUTERINE PREGNANCY IN MULTIGRAVIDA IN THIRD TRIMESTER: Primary | ICD-10-CM

## 2022-10-24 PROCEDURE — 91313 COVID-19,PF,MODERNA BIVALENT: CPT | Performed by: OBSTETRICS & GYNECOLOGY

## 2022-10-24 PROCEDURE — 0134A COVID-19,PF,MODERNA BIVALENT: CPT | Performed by: OBSTETRICS & GYNECOLOGY

## 2022-10-24 PROCEDURE — 99207 PR PRENATAL VISIT: CPT | Performed by: OBSTETRICS & GYNECOLOGY

## 2022-10-24 NOTE — PROGRESS NOTES
Doing well. Good fetal movement. No regular contractions. Interested in the bivalent booster today then will plan on the flu vaccine at the next visit. About to choose a Pediatrician. Discussed breastfeeding/ pumping and is still deciding.   RTC in 2 weeks  Geno Lanier MD

## 2022-11-08 ENCOUNTER — PRENATAL OFFICE VISIT (OUTPATIENT)
Dept: OBGYN | Facility: CLINIC | Age: 32
End: 2022-11-08
Payer: COMMERCIAL

## 2022-11-08 ENCOUNTER — TELEPHONE (OUTPATIENT)
Dept: OBGYN | Facility: CLINIC | Age: 32
End: 2022-11-08

## 2022-11-08 VITALS
BODY MASS INDEX: 25.33 KG/M2 | DIASTOLIC BLOOD PRESSURE: 60 MMHG | SYSTOLIC BLOOD PRESSURE: 105 MMHG | WEIGHT: 157.6 LBS | HEIGHT: 66 IN

## 2022-11-08 DIAGNOSIS — Z23 NEED FOR PROPHYLACTIC VACCINATION AND INOCULATION AGAINST INFLUENZA: Primary | ICD-10-CM

## 2022-11-08 DIAGNOSIS — L29.9 PRURITUS OF PREGNANCY IN THIRD TRIMESTER: Primary | ICD-10-CM

## 2022-11-08 DIAGNOSIS — O99.713 PRURITUS OF PREGNANCY IN THIRD TRIMESTER: Primary | ICD-10-CM

## 2022-11-08 PROCEDURE — 99207 PR PRENATAL VISIT: CPT | Performed by: OBSTETRICS & GYNECOLOGY

## 2022-11-08 PROCEDURE — 90471 IMMUNIZATION ADMIN: CPT | Performed by: OBSTETRICS & GYNECOLOGY

## 2022-11-08 PROCEDURE — 90686 IIV4 VACC NO PRSV 0.5 ML IM: CPT | Performed by: OBSTETRICS & GYNECOLOGY

## 2022-11-08 NOTE — TELEPHONE ENCOUNTER
Received a call from the patient stating during her appointment today she discussed with the MD checking her bile acid levels due to itchiness in her palms and soles.     Patient is looking for this order to get placed so the patient can come back to lab to have his drawn. Lab pended for review.     Will route to provider for review.     Can we leave a detailed message on this number? YES  Phone number patient can be reached at: Cell number on file:    Telephone Information:   Mobile [phone]       Flaquita Glass RN  [facility] Triage

## 2022-11-08 NOTE — PROGRESS NOTES
Prenatal Visit: good fetal movement. No contractions. Flu vaccine today. Bivalent 2 weeks ago. Discussed the GBS test at the next visit. Continuing to exercise with strength training. She can only sleep on her left side so her sleep is always interrupted. Strategies discussed to help.  RTC in 2 weeks.  Geno Lanier MD

## 2022-11-09 NOTE — TELEPHONE ENCOUNTER
Left detailed vm informing lab order in place and can call for lab only visit.  Bree Mason RN on 11/9/2022 at 9:36 AM

## 2022-11-10 ENCOUNTER — LAB (OUTPATIENT)
Dept: LAB | Facility: CLINIC | Age: 32
End: 2022-11-10
Payer: COMMERCIAL

## 2022-11-10 DIAGNOSIS — L29.9 PRURITUS OF PREGNANCY IN THIRD TRIMESTER: ICD-10-CM

## 2022-11-10 DIAGNOSIS — O99.713 PRURITUS OF PREGNANCY IN THIRD TRIMESTER: ICD-10-CM

## 2022-11-10 PROCEDURE — 82239 BILE ACIDS TOTAL: CPT | Mod: 90

## 2022-11-10 PROCEDURE — 99000 SPECIMEN HANDLING OFFICE-LAB: CPT

## 2022-11-10 PROCEDURE — 36415 COLL VENOUS BLD VENIPUNCTURE: CPT

## 2022-11-12 LAB — BILE AC SERPL-SCNC: 4 UMOL/L

## 2022-11-16 ENCOUNTER — MEDICAL CORRESPONDENCE (OUTPATIENT)
Dept: HEALTH INFORMATION MANAGEMENT | Facility: CLINIC | Age: 32
End: 2022-11-16

## 2022-11-22 ENCOUNTER — PRENATAL OFFICE VISIT (OUTPATIENT)
Dept: OBGYN | Facility: CLINIC | Age: 32
End: 2022-11-22
Payer: COMMERCIAL

## 2022-11-22 VITALS — WEIGHT: 159 LBS | DIASTOLIC BLOOD PRESSURE: 60 MMHG | SYSTOLIC BLOOD PRESSURE: 98 MMHG | BODY MASS INDEX: 25.66 KG/M2

## 2022-11-22 DIAGNOSIS — Z36.85 SCREENING, ANTENATAL, FOR STREPTOCOCCUS B: Primary | ICD-10-CM

## 2022-11-22 PROCEDURE — 99207 PR PRENATAL VISIT: CPT | Performed by: OBSTETRICS & GYNECOLOGY

## 2022-11-22 PROCEDURE — 87653 STREP B DNA AMP PROBE: CPT | Performed by: OBSTETRICS & GYNECOLOGY

## 2022-11-22 NOTE — PROGRESS NOTES
Having more discomfort. Having more anxiety about the unknown. Jesús is worried about symptoms of depression. Discussed strategies for dealing with mood changes in pregnancy. Therapy and or medication. Will monitor for now. Planning to encapsulate the placenta at this time. I discussed when that would not be recommended: if GBS positive or if hypertensive disease of pregnancy is present.  GBS done. SVE declined  RTC in 1 week  Geno Lanier MD

## 2022-11-23 LAB — GP B STREP DNA SPEC QL NAA+PROBE: NEGATIVE

## 2022-12-05 ENCOUNTER — PRENATAL OFFICE VISIT (OUTPATIENT)
Dept: OBGYN | Facility: CLINIC | Age: 32
End: 2022-12-05
Payer: COMMERCIAL

## 2022-12-05 VITALS — DIASTOLIC BLOOD PRESSURE: 62 MMHG | BODY MASS INDEX: 25.99 KG/M2 | WEIGHT: 161 LBS | SYSTOLIC BLOOD PRESSURE: 104 MMHG

## 2022-12-05 DIAGNOSIS — Z34.83 SUPERVISION OF NORMAL INTRAUTERINE PREGNANCY IN MULTIGRAVIDA IN THIRD TRIMESTER: Primary | ICD-10-CM

## 2022-12-05 PROCEDURE — 99207 PR PRENATAL VISIT: CPT | Performed by: OBSTETRICS & GYNECOLOGY

## 2022-12-05 NOTE — PROGRESS NOTES
Prenatal visit: doing well. Good fetal movement. No concerns. GBS negative. Labor precautions reviewed.  RTC in one week  Geno Lanier MD

## 2022-12-13 ENCOUNTER — PRENATAL OFFICE VISIT (OUTPATIENT)
Dept: OBGYN | Facility: CLINIC | Age: 32
End: 2022-12-13
Payer: COMMERCIAL

## 2022-12-13 VITALS — DIASTOLIC BLOOD PRESSURE: 60 MMHG | BODY MASS INDEX: 26.73 KG/M2 | WEIGHT: 165.6 LBS | SYSTOLIC BLOOD PRESSURE: 105 MMHG

## 2022-12-13 DIAGNOSIS — Z34.83 SUPERVISION OF NORMAL INTRAUTERINE PREGNANCY IN MULTIGRAVIDA IN THIRD TRIMESTER: Primary | ICD-10-CM

## 2022-12-13 PROCEDURE — 99207 PR PRENATAL VISIT: CPT | Performed by: OBSTETRICS & GYNECOLOGY

## 2022-12-13 NOTE — PROGRESS NOTES
Prenatal Visit: doing well. Good fetal movement. Declines SVE. Reviewed birth preferences and all are reasonable. They are not  to any of the components and realize that things may change in the setting of an induction of labor. Recommend SVE at the next visit if still pregnant for delivery planning.  Geno Lanier MD

## 2022-12-14 ENCOUNTER — HOSPITAL ENCOUNTER (INPATIENT)
Facility: CLINIC | Age: 32
LOS: 3 days | Discharge: HOME HEALTH | End: 2022-12-17
Attending: OBSTETRICS & GYNECOLOGY | Admitting: OBSTETRICS & GYNECOLOGY
Payer: COMMERCIAL

## 2022-12-14 ENCOUNTER — ANESTHESIA EVENT (OUTPATIENT)
Dept: OBGYN | Facility: CLINIC | Age: 32
End: 2022-12-14
Payer: COMMERCIAL

## 2022-12-14 ENCOUNTER — TELEPHONE (OUTPATIENT)
Dept: OBGYN | Facility: CLINIC | Age: 32
End: 2022-12-14

## 2022-12-14 ENCOUNTER — ANESTHESIA (OUTPATIENT)
Dept: OBGYN | Facility: CLINIC | Age: 32
End: 2022-12-14
Payer: COMMERCIAL

## 2022-12-14 LAB
ABO/RH(D): NORMAL
ANTIBODY SCREEN: NEGATIVE
ERYTHROCYTE [DISTWIDTH] IN BLOOD BY AUTOMATED COUNT: 13.2 % (ref 10–15)
HCT VFR BLD AUTO: 41.3 % (ref 35–47)
HGB BLD-MCNC: 13.9 G/DL (ref 11.7–15.7)
HOLD SPECIMEN: NORMAL
HOLD SPECIMEN: NORMAL
MCH RBC QN AUTO: 31.1 PG (ref 26.5–33)
MCHC RBC AUTO-ENTMCNC: 33.7 G/DL (ref 31.5–36.5)
MCV RBC AUTO: 92 FL (ref 78–100)
PLATELET # BLD AUTO: 218 10E3/UL (ref 150–450)
RBC # BLD AUTO: 4.47 10E6/UL (ref 3.8–5.2)
SPECIMEN EXPIRATION DATE: NORMAL
WBC # BLD AUTO: 14.5 10E3/UL (ref 4–11)

## 2022-12-14 PROCEDURE — 370N000003 HC ANESTHESIA WARD SERVICE

## 2022-12-14 PROCEDURE — 258N000003 HC RX IP 258 OP 636: Performed by: OBSTETRICS & GYNECOLOGY

## 2022-12-14 PROCEDURE — G0463 HOSPITAL OUTPT CLINIC VISIT: HCPCS | Mod: 25

## 2022-12-14 PROCEDURE — 86901 BLOOD TYPING SEROLOGIC RH(D): CPT | Performed by: OBSTETRICS & GYNECOLOGY

## 2022-12-14 PROCEDURE — 85027 COMPLETE CBC AUTOMATED: CPT | Performed by: OBSTETRICS & GYNECOLOGY

## 2022-12-14 PROCEDURE — 86850 RBC ANTIBODY SCREEN: CPT | Performed by: OBSTETRICS & GYNECOLOGY

## 2022-12-14 PROCEDURE — 3E0R3BZ INTRODUCTION OF ANESTHETIC AGENT INTO SPINAL CANAL, PERCUTANEOUS APPROACH: ICD-10-PCS | Performed by: ANESTHESIOLOGY

## 2022-12-14 PROCEDURE — 36415 COLL VENOUS BLD VENIPUNCTURE: CPT | Performed by: OBSTETRICS & GYNECOLOGY

## 2022-12-14 PROCEDURE — 250N000009 HC RX 250: Performed by: ANESTHESIOLOGY

## 2022-12-14 PROCEDURE — 10907ZC DRAINAGE OF AMNIOTIC FLUID, THERAPEUTIC FROM PRODUCTS OF CONCEPTION, VIA NATURAL OR ARTIFICIAL OPENING: ICD-10-PCS | Performed by: OBSTETRICS & GYNECOLOGY

## 2022-12-14 PROCEDURE — 120N000001 HC R&B MED SURG/OB

## 2022-12-14 PROCEDURE — 86780 TREPONEMA PALLIDUM: CPT | Performed by: OBSTETRICS & GYNECOLOGY

## 2022-12-14 PROCEDURE — 00HU33Z INSERTION OF INFUSION DEVICE INTO SPINAL CANAL, PERCUTANEOUS APPROACH: ICD-10-PCS | Performed by: ANESTHESIOLOGY

## 2022-12-14 PROCEDURE — 250N000011 HC RX IP 250 OP 636: Performed by: ANESTHESIOLOGY

## 2022-12-14 PROCEDURE — 59025 FETAL NON-STRESS TEST: CPT

## 2022-12-14 RX ORDER — CARBOPROST TROMETHAMINE 250 UG/ML
250 INJECTION, SOLUTION INTRAMUSCULAR
Status: DISCONTINUED | OUTPATIENT
Start: 2022-12-14 | End: 2022-12-15 | Stop reason: HOSPADM

## 2022-12-14 RX ORDER — ONDANSETRON 4 MG/1
4 TABLET, ORALLY DISINTEGRATING ORAL EVERY 6 HOURS PRN
Status: DISCONTINUED | OUTPATIENT
Start: 2022-12-14 | End: 2022-12-14 | Stop reason: HOSPADM

## 2022-12-14 RX ORDER — ONDANSETRON 2 MG/ML
4 INJECTION INTRAMUSCULAR; INTRAVENOUS EVERY 6 HOURS PRN
Status: DISCONTINUED | OUTPATIENT
Start: 2022-12-14 | End: 2022-12-15 | Stop reason: HOSPADM

## 2022-12-14 RX ORDER — PROCHLORPERAZINE MALEATE 5 MG
10 TABLET ORAL EVERY 6 HOURS PRN
Status: DISCONTINUED | OUTPATIENT
Start: 2022-12-14 | End: 2022-12-15 | Stop reason: HOSPADM

## 2022-12-14 RX ORDER — LIDOCAINE 40 MG/G
CREAM TOPICAL
Status: DISCONTINUED | OUTPATIENT
Start: 2022-12-14 | End: 2022-12-15 | Stop reason: HOSPADM

## 2022-12-14 RX ORDER — TRANEXAMIC ACID 10 MG/ML
1 INJECTION, SOLUTION INTRAVENOUS EVERY 30 MIN PRN
Status: DISCONTINUED | OUTPATIENT
Start: 2022-12-14 | End: 2022-12-15 | Stop reason: HOSPADM

## 2022-12-14 RX ORDER — MISOPROSTOL 200 UG/1
800 TABLET ORAL
Status: DISCONTINUED | OUTPATIENT
Start: 2022-12-14 | End: 2022-12-15 | Stop reason: HOSPADM

## 2022-12-14 RX ORDER — METOCLOPRAMIDE HYDROCHLORIDE 5 MG/ML
10 INJECTION INTRAMUSCULAR; INTRAVENOUS EVERY 6 HOURS PRN
Status: DISCONTINUED | OUTPATIENT
Start: 2022-12-14 | End: 2022-12-14 | Stop reason: HOSPADM

## 2022-12-14 RX ORDER — NALOXONE HYDROCHLORIDE 0.4 MG/ML
0.4 INJECTION, SOLUTION INTRAMUSCULAR; INTRAVENOUS; SUBCUTANEOUS
Status: DISCONTINUED | OUTPATIENT
Start: 2022-12-14 | End: 2022-12-15 | Stop reason: HOSPADM

## 2022-12-14 RX ORDER — MISOPROSTOL 200 UG/1
400 TABLET ORAL
Status: DISCONTINUED | OUTPATIENT
Start: 2022-12-14 | End: 2022-12-15 | Stop reason: HOSPADM

## 2022-12-14 RX ORDER — OXYTOCIN/0.9 % SODIUM CHLORIDE 30/500 ML
340 PLASTIC BAG, INJECTION (ML) INTRAVENOUS CONTINUOUS PRN
Status: DISCONTINUED | OUTPATIENT
Start: 2022-12-14 | End: 2022-12-15 | Stop reason: HOSPADM

## 2022-12-14 RX ORDER — IBUPROFEN 400 MG/1
800 TABLET, FILM COATED ORAL
Status: DISCONTINUED | OUTPATIENT
Start: 2022-12-14 | End: 2022-12-16

## 2022-12-14 RX ORDER — KETOROLAC TROMETHAMINE 30 MG/ML
30 INJECTION, SOLUTION INTRAMUSCULAR; INTRAVENOUS
Status: DISCONTINUED | OUTPATIENT
Start: 2022-12-14 | End: 2022-12-16

## 2022-12-14 RX ORDER — OXYTOCIN/0.9 % SODIUM CHLORIDE 30/500 ML
100-340 PLASTIC BAG, INJECTION (ML) INTRAVENOUS CONTINUOUS PRN
Status: DISCONTINUED | OUTPATIENT
Start: 2022-12-14 | End: 2022-12-16

## 2022-12-14 RX ORDER — OXYTOCIN 10 [USP'U]/ML
10 INJECTION, SOLUTION INTRAMUSCULAR; INTRAVENOUS
Status: DISCONTINUED | OUTPATIENT
Start: 2022-12-14 | End: 2022-12-16

## 2022-12-14 RX ORDER — NALOXONE HYDROCHLORIDE 0.4 MG/ML
0.2 INJECTION, SOLUTION INTRAMUSCULAR; INTRAVENOUS; SUBCUTANEOUS
Status: DISCONTINUED | OUTPATIENT
Start: 2022-12-14 | End: 2022-12-15 | Stop reason: HOSPADM

## 2022-12-14 RX ORDER — CITRIC ACID/SODIUM CITRATE 334-500MG
30 SOLUTION, ORAL ORAL
Status: DISCONTINUED | OUTPATIENT
Start: 2022-12-14 | End: 2022-12-15 | Stop reason: HOSPADM

## 2022-12-14 RX ORDER — PROCHLORPERAZINE 25 MG
25 SUPPOSITORY, RECTAL RECTAL EVERY 12 HOURS PRN
Status: DISCONTINUED | OUTPATIENT
Start: 2022-12-14 | End: 2022-12-15 | Stop reason: HOSPADM

## 2022-12-14 RX ORDER — METOCLOPRAMIDE 10 MG/1
10 TABLET ORAL EVERY 6 HOURS PRN
Status: DISCONTINUED | OUTPATIENT
Start: 2022-12-14 | End: 2022-12-14 | Stop reason: HOSPADM

## 2022-12-14 RX ORDER — LIDOCAINE HCL/EPINEPHRINE/PF 2%-1:200K
VIAL (ML) INJECTION
Status: COMPLETED | OUTPATIENT
Start: 2022-12-14 | End: 2022-12-14

## 2022-12-14 RX ORDER — EPHEDRINE SULFATE 50 MG/ML
5 INJECTION, SOLUTION INTRAMUSCULAR; INTRAVENOUS; SUBCUTANEOUS
Status: DISCONTINUED | OUTPATIENT
Start: 2022-12-14 | End: 2022-12-15 | Stop reason: HOSPADM

## 2022-12-14 RX ORDER — OXYTOCIN 10 [USP'U]/ML
10 INJECTION, SOLUTION INTRAMUSCULAR; INTRAVENOUS
Status: DISCONTINUED | OUTPATIENT
Start: 2022-12-14 | End: 2022-12-15 | Stop reason: HOSPADM

## 2022-12-14 RX ORDER — PROCHLORPERAZINE 25 MG
25 SUPPOSITORY, RECTAL RECTAL EVERY 12 HOURS PRN
Status: DISCONTINUED | OUTPATIENT
Start: 2022-12-14 | End: 2022-12-14 | Stop reason: HOSPADM

## 2022-12-14 RX ORDER — METOCLOPRAMIDE HYDROCHLORIDE 5 MG/ML
10 INJECTION INTRAMUSCULAR; INTRAVENOUS EVERY 6 HOURS PRN
Status: DISCONTINUED | OUTPATIENT
Start: 2022-12-14 | End: 2022-12-15 | Stop reason: HOSPADM

## 2022-12-14 RX ORDER — ONDANSETRON 2 MG/ML
4 INJECTION INTRAMUSCULAR; INTRAVENOUS EVERY 6 HOURS PRN
Status: DISCONTINUED | OUTPATIENT
Start: 2022-12-14 | End: 2022-12-14 | Stop reason: HOSPADM

## 2022-12-14 RX ORDER — ACETAMINOPHEN 325 MG/1
650 TABLET ORAL EVERY 4 HOURS PRN
Status: DISCONTINUED | OUTPATIENT
Start: 2022-12-14 | End: 2022-12-15 | Stop reason: HOSPADM

## 2022-12-14 RX ORDER — METHYLERGONOVINE MALEATE 0.2 MG/ML
200 INJECTION INTRAVENOUS
Status: DISCONTINUED | OUTPATIENT
Start: 2022-12-14 | End: 2022-12-15 | Stop reason: HOSPADM

## 2022-12-14 RX ORDER — METOCLOPRAMIDE 10 MG/1
10 TABLET ORAL EVERY 6 HOURS PRN
Status: DISCONTINUED | OUTPATIENT
Start: 2022-12-14 | End: 2022-12-15 | Stop reason: HOSPADM

## 2022-12-14 RX ORDER — PROCHLORPERAZINE MALEATE 5 MG
10 TABLET ORAL EVERY 6 HOURS PRN
Status: DISCONTINUED | OUTPATIENT
Start: 2022-12-14 | End: 2022-12-14 | Stop reason: HOSPADM

## 2022-12-14 RX ORDER — ONDANSETRON 4 MG/1
4 TABLET, ORALLY DISINTEGRATING ORAL EVERY 6 HOURS PRN
Status: DISCONTINUED | OUTPATIENT
Start: 2022-12-14 | End: 2022-12-15 | Stop reason: HOSPADM

## 2022-12-14 RX ADMIN — LIDOCAINE HYDROCHLORIDE,EPINEPHRINE BITARTRATE 3 ML: 20; .005 INJECTION, SOLUTION EPIDURAL; INFILTRATION; INTRACAUDAL; PERINEURAL at 16:33

## 2022-12-14 RX ADMIN — Medication 12 ML/HR: at 22:52

## 2022-12-14 RX ADMIN — SODIUM CHLORIDE, POTASSIUM CHLORIDE, SODIUM LACTATE AND CALCIUM CHLORIDE 1000 ML: 600; 310; 30; 20 INJECTION, SOLUTION INTRAVENOUS at 15:49

## 2022-12-14 RX ADMIN — EPHEDRINE SULFATE 5 MG: 5 INJECTION INTRAVENOUS at 21:49

## 2022-12-14 RX ADMIN — Medication 12 ML/HR: at 16:25

## 2022-12-14 ASSESSMENT — ACTIVITIES OF DAILY LIVING (ADL)
ADLS_ACUITY_SCORE: 20

## 2022-12-14 NOTE — ANESTHESIA PROCEDURE NOTES
Epidural catheter Procedure Note    Pre-Procedure   Staff -        Anesthesiologist:  Yoel Reis MD       Performed By: anesthesiologist       Location: OB       Pre-Anesthestic Checklist: patient identified, IV checked, risks and benefits discussed, informed consent, pre-op evaluation and at physician/surgeon's request  Timeout:       Correct Patient: Yes        Correct Procedure: Yes        Correct Position: Yes   Procedure Documentation  Procedure: epidural catheter       Patient Position: sitting       Patient Prep/Sterile Barriers: sterile gloves, mask, patient draped, 3 applications of betadine, and waited for it to completely dry       Skin prep: Betadine       Local skin infiltrated with 3 mL of 1% lidocaine.        Insertion Site: L3-4. (midline approach).       Technique: LORT air        FOX at 3 cm.       Needle Type: ToTruevisiony needle       Needle Gauge: 17.        Needle Length (Inches): 3.5        Catheter: 19 G.          Catheter threaded easily.         4.5 cm epidural space.           # of attempts: 1 and  # of redirects:     Assessment/Narrative         Paresthesias: No.       Test dose of 3 mL at.         Test dose negative, 3 minutes after injection, for signs of intravascular, subdural, or intrathecal injection.       Insertion/Infusion Method: LORT air       Aspiration negative for Heme or CSF via Epidural Catheter.    Medication(s) Administered   0.125% Bupivacaine + 2 mcg/mL Fentanyl via CADD (Epidural) - EPIDURAL   8 mL - 12/14/2022 4:34:00 PM  2% Lidocaine w/ 1:200K Epi (EPIDURAL) - EPIDURAL   3 mL - 12/14/2022 4:33:00 PM   Comments:  Test dose of 3cc negative  Adhesive spray, tegaderm, and tape to secure    Orders to manage the epidural infusion have been entered and, through coordination with the nurse, we will continue to manage and monitor the patient's labor epidural.  We will continuously be available to adjust as needed throughout the entire labor and delivery process.    "      FOR [facility] ([facility]) ONLY:   Pain Team Contact information: please page the Pain Team Via [employer]. Search \"Pain\". During daytime hours, please page the attending first. At night please page the resident first.    "

## 2022-12-14 NOTE — PLAN OF CARE
Pt comes to triage with c/o ctxs q 5 mins since about 0800, and less fetal movement today. She denies leaking of fluid.    EUM/US applied. VSS. Admission database obtained and prenatal record reviewed.    SVE performed by RN with no previous office exam for comparison. Pt 3/95/-1to 0. Pt up to walk to determine labor progression.    SVE rechecked, with change noted.    Update to Dr. Lanier, and admission orders received by phone.    Pt transferred to [facility], and report given to FRANCISCO Romano.

## 2022-12-14 NOTE — ANESTHESIA PREPROCEDURE EVALUATION
Anesthesia Pre-Procedure Evaluation    Patient: La Cornelius   MRN: [id number] : 1990        Procedure : * No procedures listed *          Past Medical History:   Diagnosis Date     Known health problems: none       Past Surgical History:   Procedure Laterality Date     NO HISTORY OF SURGERY        No Known Allergies   Social History     Tobacco Use     Smoking status: Never     Smokeless tobacco: Never   Substance Use Topics     Alcohol use: Not Currently      Wt Readings from Last 1 Encounters:   22 74.8 kg (165 lb)        Anesthesia Evaluation   Pt has had prior anesthetic.     No history of anesthetic complications       ROS/MED HX  ENT/Pulmonary:    (-) sleep apnea   Neurologic:       Cardiovascular:       METS/Exercise Tolerance:     Hematologic:       Musculoskeletal:       GI/Hepatic:    (-) GERD   Renal/Genitourinary:       Endo:       Psychiatric/Substance Use:       Infectious Disease:       Malignancy:       Other:            Physical Exam    Airway        Mallampati: II   TM distance: > 3 FB   Neck ROM: full   Mouth opening: > 3 cm    Respiratory Devices and Support         Dental  no notable dental history         Cardiovascular   cardiovascular exam normal          Pulmonary   pulmonary exam normal                OUTSIDE LABS:  CBC:   Lab Results   Component Value Date    WBC 14.5 (H) 2022    WBC 13.7 (H) 2022    HGB 13.9 2022    HGB 12.0 2022    HCT 41.3 2022    HCT 36.0 2022     2022     2022     BMP:   Lab Results   Component Value Date     2021    POTASSIUM 4.2 2021    CHLORIDE 107 2021    CO2 26 2021    BUN 12 2021    CR 0.71 2021    GLC 77 2021     COAGS: No results found for: PTT, INR, FIBR  POC: No results found for: BGM, HCG, HCGS  HEPATIC:   Lab Results   Component Value Date    ALBUMIN 3.7 2021    PROTTOTAL 7.1 2021    ALT 20 2021    AST 14  03/19/2021    ALKPHOS 58 03/19/2021    BILITOTAL 0.5 03/19/2021     OTHER:   Lab Results   Component Value Date    GARRISON 8.6 03/19/2021    TSH 1.27 09/29/2022       Anesthesia Plan    ASA Status:  2      Anesthesia Type: Epidural.              Consents    Anesthesia Plan(s) and associated risks, benefits, and realistic alternatives discussed. Questions answered and patient/representative(s) expressed understanding.    - Discussed:     - Discussed with:  Patient         Postoperative Care            Comments:    Other Comments: Healthy primip            Yoel Reis MD

## 2022-12-14 NOTE — H&P
[facility]    History and Physical  Obstetrics and Gynecology     Date of Admission:  2022    Assessment & Plan   La Cornelius is a 32 year old female  .     ASSESSMENT:   IUP @ 39w2d  Active labor  A+/Rubella Immune/ GBS negative      PLAN:   Expectant management per patient preference  Epidural PRN  Prefers to have SROM versus AROM    Geno Lanier MD    History of Present Illness   La Cornelius is a 32 year old female  39w2d  Estimated Date of Delivery: Dec 19, 2022 is calculated from Patient's last menstrual period was 2022. is [facility] for active [facility]. She made cervical change from 3 cm to 4.5 cm in the maternal assessment center. Prenatal care has been uncomplicated thus far.    PRENATAL COURSE  Prenatal course was essentially uncomplicated      Recent Labs   Lab Test 22  1504   AS Negative     Rhogam not indicated   Recent Labs   Lab Test 22  0945   HEPBANG Nonreactive   HIAGAB Nonreactive   RUQIGG Positive         Prior to Admission Medications   Prior to Admission Medications   Prescriptions Last Dose Informant Patient Reported? Taking?   Prenatal Vit-Fe Fumarate-FA (PRENATAL VITAMINS PO)   Yes No      Facility-Administered Medications: None     Allergies   No Known Allergies      Immunization History   Immunization History   Administered Date(s) Administered     COVID-19 Vaccine 12+ (Pfizer ) 2022     COVID-19 Vaccine 12+ (Pfizer) 2021, 2021, 2021     COVID-19 Vaccine Bivalent Booster 18+ (Moderna) 10/24/2022     Influenza Vaccine >6 months (Alfuria,Fluzone) 2022     Tdap (Adacel,Boostrix) 2018, 2022       Past Medical History:   Diagnosis Date     Known health problems: none        Past Surgical History:   Procedure Laterality Date     NO HISTORY OF SURGERY         Vitals:    22 1230   BP: 109/63   Resp: 16   Temp: 98.6  F (37  C)   TempSrc: Temporal   Weight:  "74.8 kg (165 lb)   Height: 1.676 m (5' 6\")     Abdomen: gravid, single vertex fetus, non-tender, EFW 7 lbs 9oz  SVE: declined  FHT: 150bpm/ mod denise/ no accels/ no decels  [place]: q2-3 mins  Constitutional: healthy, alert, active and no distress   Extremities: NT, no edema  Neurologic: Awake, alert, oriented x3  Neuropsychiatric: General: normal, calm and normal eye contact  Heart: well perfused extremities  Lungs: non-labored respirations    Geno Lanier MD  "

## 2022-12-14 NOTE — PLAN OF CARE
Data: Patient admitted to room 219 at 1414. Patient is a . Prenatal record reviewed.   OB History    Para Term  AB Living   1 0 0 0 0 0   SAB IAB Ectopic Multiple Live Births   0 0 0 0 0      # Outcome Date GA Lbr Igor/2nd Weight Sex Delivery Anes PTL Lv   1 Current            .  Medical History:   Past Medical History:   Diagnosis Date     Known health problems: none    .  Gestational age 39w2d. Vital signs per doc flowsheet. Fetal movement present. Patient reports Decreased Fetal Movement and Rule Out Labor   as reason for admission. Support persons Jesús present.  Action: Report from Fidelia ALFONSO RN obtained at 1414. Care of patient assumed at 1414. Verbal consent for EFM, external fetal monitors applied. Admission assessment completed. Patient and support persons educated on labor process. Patient instructed to report change in fetal movement, contractions, vaginal leaking of fluid or bleeding, abdominal pain, or any concerns related to the pregnancy to her nurse/physician. Patient oriented to room, call light in reach.

## 2022-12-14 NOTE — TELEPHONE ENCOUNTER
39w2d,     Patient reporting cramping in her vagina and her thighs. Abdomin is tight, and feeling cramping in her lower belly.  States this has been going on for the last hour.   Patient states they are getting more uncomfortable.   DFM since this morning.    Patient to proceed to MAC at this time  MAC and Dr Lanier updated.  Pt verbalized understanding, in agreement with plan, and voiced no further questions.  Giulia Patino RN on 2022 at 10:55 AM

## 2022-12-15 LAB
HGB BLD-MCNC: 13.3 G/DL (ref 11.7–15.7)
T PALLIDUM AB SER QL: NONREACTIVE

## 2022-12-15 PROCEDURE — 120N000012 HC R&B POSTPARTUM

## 2022-12-15 PROCEDURE — 36415 COLL VENOUS BLD VENIPUNCTURE: CPT | Performed by: OBSTETRICS & GYNECOLOGY

## 2022-12-15 PROCEDURE — 59400 OBSTETRICAL CARE: CPT | Mod: 22 | Performed by: OBSTETRICS & GYNECOLOGY

## 2022-12-15 PROCEDURE — 0DQR0ZZ REPAIR ANAL SPHINCTER, OPEN APPROACH: ICD-10-PCS | Performed by: OBSTETRICS & GYNECOLOGY

## 2022-12-15 PROCEDURE — 722N000001 HC LABOR CARE VAGINAL DELIVERY SINGLE

## 2022-12-15 PROCEDURE — 250N000009 HC RX 250: Performed by: OBSTETRICS & GYNECOLOGY

## 2022-12-15 PROCEDURE — 250N000011 HC RX IP 250 OP 636: Performed by: OBSTETRICS & GYNECOLOGY

## 2022-12-15 PROCEDURE — 250N000013 HC RX MED GY IP 250 OP 250 PS 637: Performed by: OBSTETRICS & GYNECOLOGY

## 2022-12-15 PROCEDURE — 85018 HEMOGLOBIN: CPT | Performed by: OBSTETRICS & GYNECOLOGY

## 2022-12-15 RX ORDER — TRANEXAMIC ACID 10 MG/ML
1 INJECTION, SOLUTION INTRAVENOUS EVERY 30 MIN PRN
Status: DISCONTINUED | OUTPATIENT
Start: 2022-12-15 | End: 2022-12-17 | Stop reason: HOSPADM

## 2022-12-15 RX ORDER — MISOPROSTOL 200 UG/1
800 TABLET ORAL
Status: DISCONTINUED | OUTPATIENT
Start: 2022-12-15 | End: 2022-12-17 | Stop reason: HOSPADM

## 2022-12-15 RX ORDER — OXYCODONE HYDROCHLORIDE 5 MG/1
5 TABLET ORAL EVERY 4 HOURS PRN
Status: DISCONTINUED | OUTPATIENT
Start: 2022-12-15 | End: 2022-12-17 | Stop reason: HOSPADM

## 2022-12-15 RX ORDER — MISOPROSTOL 200 UG/1
400 TABLET ORAL
Status: DISCONTINUED | OUTPATIENT
Start: 2022-12-15 | End: 2022-12-17 | Stop reason: HOSPADM

## 2022-12-15 RX ORDER — CEFAZOLIN SODIUM 2 G/100ML
2 INJECTION, SOLUTION INTRAVENOUS ONCE
Status: COMPLETED | OUTPATIENT
Start: 2022-12-15 | End: 2022-12-15

## 2022-12-15 RX ORDER — TERBUTALINE SULFATE 1 MG/ML
0.25 INJECTION, SOLUTION SUBCUTANEOUS
Status: DISCONTINUED | OUTPATIENT
Start: 2022-12-15 | End: 2022-12-15 | Stop reason: HOSPADM

## 2022-12-15 RX ORDER — NALOXONE HYDROCHLORIDE 0.4 MG/ML
0.4 INJECTION, SOLUTION INTRAMUSCULAR; INTRAVENOUS; SUBCUTANEOUS
Status: DISCONTINUED | OUTPATIENT
Start: 2022-12-15 | End: 2022-12-17 | Stop reason: HOSPADM

## 2022-12-15 RX ORDER — DOCUSATE SODIUM 100 MG/1
100 CAPSULE, LIQUID FILLED ORAL 2 TIMES DAILY
Status: DISCONTINUED | OUTPATIENT
Start: 2022-12-15 | End: 2022-12-17 | Stop reason: HOSPADM

## 2022-12-15 RX ORDER — HYDROCORTISONE 25 MG/G
CREAM TOPICAL 3 TIMES DAILY PRN
Status: DISCONTINUED | OUTPATIENT
Start: 2022-12-15 | End: 2022-12-17 | Stop reason: HOSPADM

## 2022-12-15 RX ORDER — CARBOPROST TROMETHAMINE 250 UG/ML
250 INJECTION, SOLUTION INTRAMUSCULAR
Status: DISCONTINUED | OUTPATIENT
Start: 2022-12-15 | End: 2022-12-17 | Stop reason: HOSPADM

## 2022-12-15 RX ORDER — LIDOCAINE 40 MG/G
CREAM TOPICAL
Status: DISCONTINUED | OUTPATIENT
Start: 2022-12-15 | End: 2022-12-15 | Stop reason: HOSPADM

## 2022-12-15 RX ORDER — OXYTOCIN 10 [USP'U]/ML
10 INJECTION, SOLUTION INTRAMUSCULAR; INTRAVENOUS
Status: DISCONTINUED | OUTPATIENT
Start: 2022-12-15 | End: 2022-12-17 | Stop reason: HOSPADM

## 2022-12-15 RX ORDER — OXYTOCIN/0.9 % SODIUM CHLORIDE 30/500 ML
1-24 PLASTIC BAG, INJECTION (ML) INTRAVENOUS CONTINUOUS
Status: DISCONTINUED | OUTPATIENT
Start: 2022-12-15 | End: 2022-12-15 | Stop reason: HOSPADM

## 2022-12-15 RX ORDER — OXYTOCIN/0.9 % SODIUM CHLORIDE 30/500 ML
340 PLASTIC BAG, INJECTION (ML) INTRAVENOUS CONTINUOUS PRN
Status: DISCONTINUED | OUTPATIENT
Start: 2022-12-15 | End: 2022-12-17 | Stop reason: HOSPADM

## 2022-12-15 RX ORDER — NALOXONE HYDROCHLORIDE 0.4 MG/ML
0.2 INJECTION, SOLUTION INTRAMUSCULAR; INTRAVENOUS; SUBCUTANEOUS
Status: DISCONTINUED | OUTPATIENT
Start: 2022-12-15 | End: 2022-12-17 | Stop reason: HOSPADM

## 2022-12-15 RX ORDER — METHYLERGONOVINE MALEATE 0.2 MG/ML
200 INJECTION INTRAVENOUS
Status: DISCONTINUED | OUTPATIENT
Start: 2022-12-15 | End: 2022-12-17 | Stop reason: HOSPADM

## 2022-12-15 RX ORDER — SODIUM CHLORIDE, SODIUM LACTATE, POTASSIUM CHLORIDE, CALCIUM CHLORIDE 600; 310; 30; 20 MG/100ML; MG/100ML; MG/100ML; MG/100ML
INJECTION, SOLUTION INTRAVENOUS CONTINUOUS PRN
Status: DISCONTINUED | OUTPATIENT
Start: 2022-12-15 | End: 2022-12-15 | Stop reason: HOSPADM

## 2022-12-15 RX ORDER — ACETAMINOPHEN 325 MG/1
650 TABLET ORAL EVERY 4 HOURS PRN
Status: DISCONTINUED | OUTPATIENT
Start: 2022-12-15 | End: 2022-12-17 | Stop reason: HOSPADM

## 2022-12-15 RX ORDER — IBUPROFEN 400 MG/1
800 TABLET, FILM COATED ORAL EVERY 6 HOURS PRN
Status: DISCONTINUED | OUTPATIENT
Start: 2022-12-15 | End: 2022-12-17 | Stop reason: HOSPADM

## 2022-12-15 RX ORDER — MODIFIED LANOLIN
OINTMENT (GRAM) TOPICAL
Status: DISCONTINUED | OUTPATIENT
Start: 2022-12-15 | End: 2022-12-17 | Stop reason: HOSPADM

## 2022-12-15 RX ADMIN — DOCUSATE SODIUM 100 MG: 100 CAPSULE, LIQUID FILLED ORAL at 22:44

## 2022-12-15 RX ADMIN — ACETAMINOPHEN 650 MG: 325 TABLET, FILM COATED ORAL at 23:44

## 2022-12-15 RX ADMIN — IBUPROFEN 800 MG: 400 TABLET, FILM COATED ORAL at 18:01

## 2022-12-15 RX ADMIN — Medication 2 MILLI-UNITS/MIN: at 02:17

## 2022-12-15 RX ADMIN — IBUPROFEN 800 MG: 400 TABLET, FILM COATED ORAL at 05:22

## 2022-12-15 RX ADMIN — OXYCODONE HYDROCHLORIDE 5 MG: 5 TABLET ORAL at 18:55

## 2022-12-15 RX ADMIN — IBUPROFEN 800 MG: 400 TABLET, FILM COATED ORAL at 11:43

## 2022-12-15 RX ADMIN — ACETAMINOPHEN 650 MG: 325 TABLET, FILM COATED ORAL at 18:02

## 2022-12-15 RX ADMIN — Medication: at 14:25

## 2022-12-15 RX ADMIN — PSYLLIUM HUSK 1 PACKET: 3.4 POWDER ORAL at 10:48

## 2022-12-15 RX ADMIN — IBUPROFEN 800 MG: 400 TABLET, FILM COATED ORAL at 23:44

## 2022-12-15 RX ADMIN — ACETAMINOPHEN 650 MG: 325 TABLET, FILM COATED ORAL at 05:22

## 2022-12-15 RX ADMIN — OXYCODONE HYDROCHLORIDE 5 MG: 5 TABLET ORAL at 14:23

## 2022-12-15 RX ADMIN — CEFAZOLIN SODIUM 2 G: 2 INJECTION, SOLUTION INTRAVENOUS at 04:13

## 2022-12-15 RX ADMIN — ACETAMINOPHEN 650 MG: 325 TABLET, FILM COATED ORAL at 10:47

## 2022-12-15 RX ADMIN — DOCUSATE SODIUM 100 MG: 100 CAPSULE, LIQUID FILLED ORAL at 10:47

## 2022-12-15 ASSESSMENT — ACTIVITIES OF DAILY LIVING (ADL)
ADLS_ACUITY_SCORE: 24
ADLS_ACUITY_SCORE: 24
ADLS_ACUITY_SCORE: 20
ADLS_ACUITY_SCORE: 24
ADLS_ACUITY_SCORE: 20
ADLS_ACUITY_SCORE: 20
ADLS_ACUITY_SCORE: 24

## 2022-12-15 NOTE — ANESTHESIA POSTPROCEDURE EVALUATION
Patient: La Cornelius    Procedure: * No procedures listed *       Anesthesia Type:  Epidural    Note:  Disposition: Inpatient   Postop Pain Control: Uneventful            Sign Out: Well controlled pain   PONV: No   Neuro/Psych: Uneventful            Sign Out: Acceptable/Baseline neuro status   Airway/Respiratory: Uneventful            Sign Out: Acceptable/Baseline resp. status   CV/Hemodynamics: Uneventful            Sign Out: Acceptable CV status   Other NRE: NONE   DID A NON-ROUTINE EVENT OCCUR? No    Event details/Postop Comments:  Patient doing well.  Ambulating without difficulty. Denies residual numbness/weakness.  No complaints/concerns.           Last vitals:  Vitals:    12/15/22 0900 12/15/22 1030 12/15/22 1047   BP: 107/70 98/55 109/64   Pulse: 81 58 66   Resp: 16     Temp: 36.7  C (98  F)     SpO2:   100%       Electronically Signed By: Howie Aguayo MD  December 15, 2022  4:45 PM

## 2022-12-15 NOTE — CARE PLAN
Data: La Cornelius transferred from 219 to 412.   Action: Report given to FRANCISCO Gonzalez.  Accompanied by Registered Nurse. Oriented family to surroundings. Call light within reach. ID bands double-checked with transferring RN and parents  Response: Patient tolerated transfer and is stable.

## 2022-12-15 NOTE — PLAN OF CARE
Pt arrived to postpartum unit at 0600 with spouse and infant in arms. Fundus firm and U/2 after st cath for 600, light flow. Ice and tucks pads for perineal swelling. Breastfeeding infant, assisted with latching and positioning using nipple shield. Oriented to unit and safety measures. Continue with plan of care and notify MD as needed.

## 2022-12-15 NOTE — CARE PLAN
Dr. Lanier at bedside at 0209 to evaluate labor progress and discuss repetitive variable decelerations. MD pushed with patient for another hour. Given lack of labor progress, MD discussed vacuum extraction. Patient and spouse consented at 0307. NICU team called and present for delivery. Viable female infant born at 0330 (see provider delivery note). Pitocin initiated after delivery of baby. Placenta delivered at 0334. 3rd degree laceration noted. One dose of IV ancef given. Plan for routine postpartum and  cares.

## 2022-12-15 NOTE — PROGRESS NOTES
B:   @ 39.2 gestation   AROM at 1930 with thick mec fluid noted   Maternal Labs: A+/RI/Hepb-/HIV-/GBS-  ALL: NKDA   no medical/pregnancy related concerns Baby girl breast Peds:SLP    A:  Rec'd report from Rosalina SINGH @ 2884 and assumed care of pt with education to plan of care with verbal understanding and agreement;  SVE 9/100/0 at 2230   Non reactive tracing, minimal variability, moderate at times.     R:  Manage labor and pain as needed; Intrapartum plan of care

## 2022-12-15 NOTE — PLAN OF CARE
Vital signs stable, assessment WNL. Able to ambulate to bathroom with no dizziness this afternoon, voiding without difficulty. Patient reports rectal pressure and soreness at sutures; Tylenol, ibuprofen, benzocaine spray, and occasional oxycodone for pain.  at bedside and supportive. Working on breastfeeding, using a nipple shield. Encouraged to call RN with needs, call light within reach.

## 2022-12-15 NOTE — PROVIDER NOTIFICATION
12/15/22 0209   Provider Notification   Provider Name/Title Yolande   Method of Notification At Bedside   Request Evaluate in Person   Dr. Lanier at bedside to evaluate labor progress. SBAR report given.

## 2022-12-15 NOTE — PROVIDER NOTIFICATION
12/14/22 2349   Provider Notification   Provider Name/Title Dr. Lanier   Method of Notification Phone   Request Evaluate - Remote   Notification Reason SVE   Dr. Lanier called, updated on SVE 10/100/1. Discussed practice pushes without movement. Plan to labor down for 30 minutes and begin pushing. Dr. Lanier will leave around 12:30 unless needed sooner.

## 2022-12-15 NOTE — L&D DELIVERY NOTE
Delivery Summary    La Cornelius MRN# [id number]   Age: 32 year old YOB: 1990     ASSESSMENT & PLAN:   La had been pushing for 3 hours with visible exhaustion and fetal tachycardia. She was consented about a vacuum assisted vaginal delivery. The risks of fetal cerebral hemorrhage and vaginal laceration were discussed. I also discussed that a  section would be the next step if unsuccessful. With verbal consent and with the bladder emptied the vacuum was applied with contractions. There were three pulls per each contraction with removal of the vacuum in between. The vacuum was applied 7 times over 20 minutes with a total of 2 minutes of pulling. A kiwi was used applied 3 cm anterior to the posterior fontanelle. Given the dynamics of pushing the fetal head actually delivered mostly and the remainder of the delivery was completed without the vacuum. There was a nuchal cord that was reduced and delayed cord clamping was done. She sustained a third degree laceration that was repaired by initially approximating the external anal sphincter posteriorly, inferiorly, superiorly and anteriorly. The remainder of the laceration was repaired in usual fashion. A rectal exam was performed with no palpable suture. One dose of Ancef was given for prophylaxis for a third degree laceration.       Adryan, Female-La [[id number]]    Labor Event Times    Labor onset date: 22 Onset time:  8:00 AM   Dilation complete date: 22 Complete time: 11:35 PM   Start pushing date/time: 12/15/2022 0012      Labor Events     labor?: No   steroids: None  Labor Type: Spontaneous  Predominate monitoring during 1st stage: continuous electronic fetal monitoring     Antibiotics received during labor?: No     Rupture date/time: 22 1930   Rupture type: Artificial Rupture of Membranes  Fluid color: Meconium     Augmentation: AROM  Indications for augmentation: Ineffective Contraction  Pattern  1:1 continuous labor support provided by?: RN       Delivery/Placenta Date and Time    Delivery Date: 12/15/22 Delivery Time:  3:30 AM   Placenta Date/Time: 12/15/2022  3:34 AM  Oxytocin given at the time of delivery: after delivery of baby  Delivering clinician: Geno Lanier MD   Other personnel present at delivery:  Provider Role   Fabienne Pearce, RN Registered Nurse   Maine Wells RN Registered Nurse   Franchesca South RN Registered Nurse         Vaginal Counts     Initial count performed by 2 team members:  Two Team Members   RFANCISCO Edwards Dr.       [place] Suture Needles Sponges (RETIRED) Instruments   Initial counts 2 0 5    Added to count  2     Relief counts       Final counts             Placed during labor Accounted for at the end of labor   FSE Yes Yes   IUPC NA NA   Cervidil NA NA              Final count performed by 2 team members:  Two Team Members   Dr. Yolande Pearce         Apgars    Living status: Living   1 Minute 5 Minute 10 Minute 15 Minute 20 Minute   Skin color: 0  1       Heart rate: 2  2       Reflex irritability: 2  2       Muscle tone: 1  2       Respiratory effort: 2  2       Total: 7  9       Apgars assigned by: RENE ENCARNACION CNP     Cord    Cord Complications: Nuchal   Nuchal Intervention: reduced         Nuchal cord description: loose nuchal cord         Stem cell collection?: No       [place] Resuscitation    Methods: Oximetry  [place] Care at Delivery: NICU delivery team called by Dr. Geno Lanier to attend the vaginal delivery of a term infant with category 2 tracings, meconium stained fluid and vacuum-assist. Infant delivered with some tone, placed on mother's chest, dried and stimulated with good cry and improved tone. Delayed cord clamping performed, cord was then clamped and cut and infant brought to pre-warmed radiant warmer. Good respiratory effort and good tone, however breath sounds noted to be  "very coarse in quality. Deep suctioned for thick light brown secretions, eliciting some coughs. Pulse oximetry applied to right wrist, saturations appropriate for age. Monitored until 10 minutes of life due to continued audible coarse breath sounds and some mild nasal flaring. Infant left in the care of the L&D team for normal  cares. RENE Gramajo, CNP-BC 12/15/2022 3:46 AM    Output in Delivery Room: Stool, Voided      Measurements    Weight: 7 lb 7.9 oz Length: 1' 8.28\"   Head circumference: 33.5 cm    Output in delivery room: Stool, Voided     Skin to Skin and Feeding Plan    Skin to skin initiation date/time: 1841    Skin to skin with: Mother  Skin to skin end date/time:        Labor Events and Shoulder Dystocia    Fetal Tracing Prior to Delivery: Category 2  Fetal Tracing Comments: variable decelerations with pushing     Delivery (Maternal) (Provider to Complete) ([id number])    Episiotomy: None  Perineal lacerations: 3rd Repaired?: Yes   Repair suture: 3-0 Vicryl  Genital tract inspection done: Pos     Blood Loss  Mother: La Cornelius #[id number]   Start of Mother's Information    Delivery Blood Loss  22 0800 - 12/15/22 0414    None           End of Mother's Information  Mother: La Cornelius #[id number]          Delivery - Provider to Complete ([id number])    Delivering clinician: Geno Lanier MD  Attempted Delivery Types (Choose all that apply): Vacuum (Extractor)  Delivery Type (Choose the 1 that will go to the Birth History): Vaginal, Vacuum (Extractor)    Verbal Informed Consent Obtained For Vacuum: Yes Alternate Labor Strategies Discussed (vacuum): Yes    Emergency Resources Available (vacuum): Charge Nurse/Team, Resuscitation Team   Type (vacuum): Kiwi Accrued Pulling Time (vacuum): 2 minutes      # of Pop-Offs (vacuum): 0 # of Pulls/Contractions (vacuum): 3   Position (vacuum): OA Fetal Station (vacuum): +3      Indication for Operative Vaginal Delivery " (vacuum): Maternal Exhaustion, Prolonged 2nd Stage   Operative Vaginal Delivery Brief Note Vacuum: La had been pushing for 3 hours with visible exhaustion and fetal tachycardia. She was consented about a vacuum assisted vaginal delivery. The risks of fetal cerebral hemorrhage and vaginal laceration were discussed. I also discussed that a  section would be the next step if unsuccessful. With verbal consent and with the bladder emptied the vacuum was applied with contractions. There were three pulls per each contraction with removal of the vacuum in between. The vacuum was applied 7 times over 20 minutes with a total of 2 minutes of pulling. A kiwi was used applied 3 cm anterior to the posterior fontanelle. Given the dynamics of pushing the fetal head actually delivered mostly and the remainder of the delivery was completed without the vacuum. There was a nuchal cord that was reduced and delayed cord clamping was done. She sustained a third degree laceration that was repaired by initially approximating the external anal sphincter posteriorly, inferiorly, superiorly and anteriorly. The remainder of the laceration was repaired in usual fashion. A rectal exam was performed with no palpable suture.            Other personnel:  Provider Role   Fabienne Pearce, RN Registered Nurse   Maine Wells RN Registered Nurse   Franchesca South RN Registered Nurse                Placenta    Date/Time: 12/15/2022  3:34 AM  Removal: Spontaneous  Disposition: Hospital disposal           Anesthesia    Method: Epidural  Cervical dilation at placement: 4-7                Presentation and Position    Presentation: Vertex    Position: Right Occiput Anterior                 Geno Lanier MD

## 2022-12-15 NOTE — PLAN OF CARE
Patient ambulated to bathroom, no dizziness or lightheadedness reported while ambulating. Patient able to empty bladder while discussing perineal care with RN. When patient stood to pull underwear up, reported feeling lightheaded and sweaty. RN called for assistance and Bree Steady. Gait belt applied, and patient able to Bree Steady back to bed. Vital signs stable, no dizziness reported by patient while in bed. Patient encouraged to call RN for assistance before ambulating.

## 2022-12-16 LAB — HGB BLD-MCNC: 12.9 G/DL (ref 11.7–15.7)

## 2022-12-16 PROCEDURE — 36415 COLL VENOUS BLD VENIPUNCTURE: CPT | Performed by: OBSTETRICS & GYNECOLOGY

## 2022-12-16 PROCEDURE — 120N000012 HC R&B POSTPARTUM

## 2022-12-16 PROCEDURE — 250N000013 HC RX MED GY IP 250 OP 250 PS 637: Performed by: OBSTETRICS & GYNECOLOGY

## 2022-12-16 PROCEDURE — 85018 HEMOGLOBIN: CPT | Performed by: OBSTETRICS & GYNECOLOGY

## 2022-12-16 RX ADMIN — ACETAMINOPHEN 650 MG: 325 TABLET, FILM COATED ORAL at 19:21

## 2022-12-16 RX ADMIN — DOCUSATE SODIUM 100 MG: 100 CAPSULE, LIQUID FILLED ORAL at 19:47

## 2022-12-16 RX ADMIN — IBUPROFEN 800 MG: 400 TABLET, FILM COATED ORAL at 06:03

## 2022-12-16 RX ADMIN — DOCUSATE SODIUM 100 MG: 100 CAPSULE, LIQUID FILLED ORAL at 12:21

## 2022-12-16 RX ADMIN — IBUPROFEN 800 MG: 400 TABLET, FILM COATED ORAL at 12:16

## 2022-12-16 RX ADMIN — PSYLLIUM HUSK 1 PACKET: 3.4 POWDER ORAL at 12:22

## 2022-12-16 RX ADMIN — ACETAMINOPHEN 650 MG: 325 TABLET, FILM COATED ORAL at 06:02

## 2022-12-16 RX ADMIN — ACETAMINOPHEN 650 MG: 325 TABLET, FILM COATED ORAL at 12:16

## 2022-12-16 RX ADMIN — IBUPROFEN 800 MG: 400 TABLET, FILM COATED ORAL at 19:20

## 2022-12-16 ASSESSMENT — ACTIVITIES OF DAILY LIVING (ADL)
ADLS_ACUITY_SCORE: 20
ADLS_ACUITY_SCORE: 24
ADLS_ACUITY_SCORE: 20

## 2022-12-16 NOTE — PROGRESS NOTES
Obstetrics Postpartum Rounding Note, PPD#1    S: Doing well. Pain controlled.  Minimal lochia.   Baby on bili lights      O:   Vitals:    12/15/22 1047 12/15/22 1945 12/15/22 2345 12/16/22 0844   BP: 109/64 107/66 112/68 103/67   BP Location:  Right arm     Patient Position:  Semi-Rosen's     Cuff Size:  Adult Regular     Pulse: 66 62 80 77   Resp:  16 16 18   Temp:  97.8  F (36.6  C) 98.1  F (36.7  C) 97.4  F (36.3  C)   TempSrc:  Axillary Oral Oral   SpO2: 100%      Weight:       Height:           Gen: AOx3, NAD  Abd: soft, ND, non ttp  Ext: no calf ttp, no edema    Labs:         Hemoglobin   Date Value Ref Range Status   12/16/2022 12.9 11.7 - 15.7 g/dL Final   12/15/2022 13.3 11.7 - 15.7 g/dL Final   03/19/2021 13.1 11.7 - 15.7 g/dL Final          No results found for: RH    Meds:  Current Facility-Administered Medications   Medication     acetaminophen (TYLENOL) tablet 650 mg     benzocaine (AMERICAINE) 20 % topical spray     carboprost (HEMABATE) injection 250 mcg     docusate sodium (COLACE) capsule 100 mg     hydrocortisone (Perianal) (ANUSOL-HC) 2.5 % cream     ibuprofen (ADVIL/MOTRIN) tablet 800 mg     lanolin cream     methylergonovine (METHERGINE) injection 200 mcg     misoprostol (CYTOTEC) tablet 400 mcg    Or     misoprostol (CYTOTEC) tablet 800 mcg     naloxone (NARCAN) injection 0.2 mg    Or     naloxone (NARCAN) injection 0.4 mg    Or     naloxone (NARCAN) injection 0.2 mg    Or     naloxone (NARCAN) injection 0.4 mg     No MMR Needed - Assessment: Patient does not need MMR vaccine     No Tdap Needed - Assessment: Patient does not need Tdap vaccine     oxyCODONE (ROXICODONE) tablet 5 mg     oxytocin (PITOCIN) 30 units in 500 mL 0.9% NaCl infusion     oxytocin (PITOCIN) injection 10 Units     psyllium (METAMUCIL/KONSYL) Packet 1 packet     tranexamic acid 1 g in 100 mL NS IV bag (premix)       A/P: PPD#1 s/p VAVD, doing well.    -Continue routine care.  -Anticipate discharge tomorrow    Bella  Leslie Masters, DO  December 16, 2022

## 2022-12-16 NOTE — PLAN OF CARE
Vital signs stable, afebrile, voiding well, ice and tucks to swollen perineum prn, FFU/1 scant rubra lochia, able to ambulate now without dizziness, able to rest, pain well controlled with medications, breast feeding  skin-to-skin on demand,  sleepy at breast, with this RN assist  can latch without a nipple shield but uses it prn.  is here and is supportive.

## 2022-12-16 NOTE — PLAN OF CARE
VS WDL. Fundus firm, midline, 1 below the U. Lochia scant. Pain managed with tylenol, ibuprofen, and oxycodone. Ice, tucks, and spray used for comfort. Breastfeeding well. Spouse at bedside. Parents independent with  cares.

## 2022-12-17 VITALS
BODY MASS INDEX: 26.52 KG/M2 | WEIGHT: 165 LBS | RESPIRATION RATE: 16 BRPM | DIASTOLIC BLOOD PRESSURE: 68 MMHG | SYSTOLIC BLOOD PRESSURE: 103 MMHG | HEART RATE: 85 BPM | OXYGEN SATURATION: 100 % | HEIGHT: 66 IN | TEMPERATURE: 98 F

## 2022-12-17 PROCEDURE — 250N000013 HC RX MED GY IP 250 OP 250 PS 637: Performed by: OBSTETRICS & GYNECOLOGY

## 2022-12-17 RX ORDER — IBUPROFEN 800 MG/1
800 TABLET, FILM COATED ORAL EVERY 6 HOURS PRN
Qty: 30 TABLET | Refills: 3 | Status: SHIPPED | OUTPATIENT
Start: 2022-12-17 | End: 2022-12-23

## 2022-12-17 RX ORDER — DOCUSATE SODIUM 100 MG/1
100 CAPSULE, LIQUID FILLED ORAL 2 TIMES DAILY
Qty: 30 CAPSULE | Refills: 1 | Status: SHIPPED | OUTPATIENT
Start: 2022-12-17

## 2022-12-17 RX ORDER — ACETAMINOPHEN 325 MG/1
650 TABLET ORAL EVERY 4 HOURS PRN
Qty: 30 TABLET | Refills: 3 | Status: SHIPPED | OUTPATIENT
Start: 2022-12-17 | End: 2023-01-19

## 2022-12-17 RX ADMIN — DOCUSATE SODIUM 100 MG: 100 CAPSULE, LIQUID FILLED ORAL at 08:32

## 2022-12-17 RX ADMIN — IBUPROFEN 800 MG: 400 TABLET, FILM COATED ORAL at 03:19

## 2022-12-17 RX ADMIN — IBUPROFEN 800 MG: 400 TABLET, FILM COATED ORAL at 08:32

## 2022-12-17 RX ADMIN — IBUPROFEN 800 MG: 400 TABLET, FILM COATED ORAL at 15:56

## 2022-12-17 RX ADMIN — ACETAMINOPHEN 650 MG: 325 TABLET, FILM COATED ORAL at 15:58

## 2022-12-17 RX ADMIN — ACETAMINOPHEN 650 MG: 325 TABLET, FILM COATED ORAL at 08:32

## 2022-12-17 RX ADMIN — PSYLLIUM HUSK 1 PACKET: 3.4 POWDER ORAL at 08:32

## 2022-12-17 RX ADMIN — ACETAMINOPHEN 650 MG: 325 TABLET, FILM COATED ORAL at 03:19

## 2022-12-17 ASSESSMENT — ACTIVITIES OF DAILY LIVING (ADL)
ADLS_ACUITY_SCORE: 20

## 2022-12-17 NOTE — PLAN OF CARE
Vital signs stable. Postpartum assessment WDL. Pain controlled with   Tylenol and Ibuprofen. Using Ice and tucks on perineum. . Patient voiding without difficulty. Breastfeeding on cue with minimal assist. Using tube and breast shield at breast for supplementation. Baby is not as sleepy at breast as earlier. She is  on Bili bed and bank and using a bili blanket if holding the baby.  Patient and infant bonding well. Will continue with current plan of care.

## 2022-12-17 NOTE — PLAN OF CARE
Vital signs stable. Postpartum assessment WDL. Pain controlled with advil and tylenol. Patient ambulating & voiding without difficulty. Breastfeeding on cue with moderate assist- intermittently using a nipple shield & supplementing with formula via bottle. Patient and infant bonding well. Will continue with current plan of care.      Zakia Miller RN on 12/17/2022 at 7:33 AM

## 2022-12-17 NOTE — PROGRESS NOTES
Post Partum Progress Note    Subjective:  Patient is doing well. No complaints. Moderate lochia. Had BM with slight discomfort. Otherwise doing okay. Pain well controlled on pain meds. Tolerating PO and ambulating without any issues.  Denies any fever, chills, SOB, chest pain, N/V,  headache, dizziness.  Planning on breast feeding. Some difficulty with feeding. Continuing to work on this. Baby remaining admitted for elevated bilirubin.     Objective:  Patient Vitals for the past 24 hrs:   BP Temp Temp src Pulse Resp   22 0835 103/68 98  F (36.7  C) Oral 85 16   22 0040 106/65 97.8  F (36.6  C) Oral 76 16   22 1216 109/65 98  F (36.7  C) Oral 89 16       General: NAD, appears generally well  CV:  Regular rate  Resp:  Breathing comfortably on RA  Abd:  Soft, nontender, nondistended, fundus firm at approximately 2 cm below umbilicus  Ext:  trace edema in bilateral LE    Assessment and Plan:  32 year old old  post-partum day 2 s/p VAVD for maternal exhaustion, c/b 3rd degree laceration.  AFVSS.  Doing well without any complaints.    -3rd degree- healing well, discharge with PO stool softeners. Precautions reviewed.   -Hgb 13.3 >Delivery QBL (mL): 444> 12.9  -Rh positive; no rhogam needed  -Rubella immune; no MMR needed  -Breast feeding   -Discharge today     Ariadne Mcdaniel MD, S  22

## 2022-12-23 RX ORDER — IBUPROFEN 800 MG/1
800 TABLET, FILM COATED ORAL EVERY 6 HOURS PRN
Qty: 30 TABLET | Refills: 1 | Status: SHIPPED | OUTPATIENT
Start: 2022-12-23

## 2022-12-23 NOTE — TELEPHONE ENCOUNTER
Please send medication to:  [id number] - [place], [place] - [address] AT [facility] OF [place] [address]

## 2022-12-23 NOTE — TELEPHONE ENCOUNTER
"Requested Prescriptions   Pending Prescriptions Disp Refills     ibuprofen (ADVIL/MOTRIN) 800 MG tablet 30 tablet 1     Sig: Take 1 tablet (800 mg) by mouth every 6 hours as needed for mild pain (cramping)       NSAID Medications Failed - 12/23/2022  3:15 PM        Failed - Normal ALT on file in past 12 months     Recent Labs   Lab Test 03/19/21  1440   ALT 20             Failed - Normal AST on file in past 12 months     Recent Labs   Lab Test 03/19/21  1440   AST 14             Failed - Normal CBC on file in past 12 months     Recent Labs   Lab Test 12/16/22  0842 12/15/22  1454 12/14/22  1504   WBC  --   --  14.5*   RBC  --   --  4.47   HGB 12.9   < > 13.9   HCT  --   --  41.3   PLT  --   --  218    < > = values in this interval not displayed.                 Failed - Normal serum creatinine on file in past 12 months     Recent Labs   Lab Test 03/19/21  1440   CR 0.71       Ok to refill medication if creatinine is low          Passed - Blood pressure under 140/90 in past 12 months     BP Readings from Last 3 Encounters:   12/17/22 103/68   12/13/22 105/60   12/05/22 104/62                 Passed - Recent (12 mo) or future (30 days) visit within the authorizing provider's specialty     Patient has had an office visit with the authorizing provider or a provider within the authorizing providers department within the previous 12 mos or has a future within next 30 days. See \"Patient Info\" tab in inbasket, or \"Choose Columns\" in Meds & Orders section of the refill encounter.              Passed - Patient is age 6-64 years        Passed - Medication is active on med list        Passed - No active pregnancy on record        Passed - No positive pregnancy test in past 12 months           Prescription approved per [facility] Refill Protocol.  Sending to preferred pharmacy  Giulia Patino RN on 12/23/2022 at 3:37 PM    "

## 2023-01-16 NOTE — PROGRESS NOTES
SUBJECTIVE:                                                   La Cornelius is a 32 year old female who presents to clinic today for the following health issue(s):  Patient presents with:  Breast Problem: Patient has been having some questionable clogged ducts on left breast. She has done a visit with lactation nurse and trying many different methods to help, but it keeps reoccurring. Patient has also been monitoring her temp, no signs of infection that she knows of.      HPI:  Patient presents today with mastodynia of the left breast. Is exclusively pumping her breast milk every 3-4 hours. Endorses pumping for 20-25 minutes each time with production of as much as 80oz of milk in a day. She also states that there is a 2 min stimulation setting on the pump before pumping. Patient noticed her ducts on the left breast getting clogged ~3 weeks ago. She reports having seen a lactation consultant about these concerns. She has tried warm compresses, hot showers, fully emptying breasts, and sunflower lecithin supplements which help relieve the clogging, but they return and continue to get clogged. Denies fevers, chills or erythema or swelling of the breast.    Patient's last menstrual period was 2022..   Patient is sexually active, .   reports that she has never smoked. She has never used smokeless tobacco.    Health maintenance updated:  yes    Today's PHQ-2 Score:   PHQ-2 (  [employer]) 2023   Q1: Little interest or pleasure in doing things 0   Q2: Feeling down, depressed or hopeless 0   PHQ-2 Score 0   PHQ-2 Total Score (12-17 Years)- Positive if 3 or more points; Administer PHQ-A if positive -   Q1: Little interest or pleasure in doing things -   Q2: Feeling down, depressed or hopeless -   PHQ-2 Score -     Today's PHQ-9 Score:   PHQ-9 SCORE 2022   PHQ-9 Total Score MyChart 0   PHQ-9 Total Score 0     Today's FERMIN-7 Score:   FERMIN-7 SCORE 2022   Total Score 0 (minimal anxiety)   Total  "Score 0       Problem list and histories reviewed & adjusted, as indicated.  Additional history: as documented.    Patient Active Problem List   Diagnosis     Supervision of normal intrauterine pregnancy in multigravida     Indication for care in labor or delivery     Past Surgical History:   Procedure Laterality Date     NO HISTORY OF SURGERY        Social History     Tobacco Use     Smoking status: Never     Smokeless tobacco: Never   Substance Use Topics     Alcohol use: Not Currently      Problem (# of Occurrences) Relation (Name,Age of Onset)    Diabetes (1) Maternal Grandfather    Heart Disease (1) Maternal Grandfather    Thyroid Disease (2) Mother, Maternal Grandmother    Pancreatic Cancer (1) Father (45)            Current Outpatient Medications   Medication Sig     benzocaine (AMERICAINE) 20 % external aerosol Apply topically every 4 hours as needed (perineal pain)     docusate sodium (COLACE) 100 MG capsule Take 1 capsule (100 mg) by mouth 2 times daily     ibuprofen (ADVIL/MOTRIN) 800 MG tablet Take 1 tablet (800 mg) by mouth every 6 hours as needed for mild pain (cramping)     Prenatal Vit-Fe Fumarate-FA (PRENATAL VITAMINS PO) Take 1 tablet by mouth daily     No current facility-administered medications for this visit.     No Known Allergies    ROS:  12 point review of systems negative other than symptoms noted below or in the HPI.  Breast: Tenderness        OBJECTIVE:     BP 98/60   Ht 1.676 m (5' 6\")   Wt 67.1 kg (148 lb)   LMP 03/14/2022   Breastfeeding Yes   BMI 23.89 kg/m    Body mass index is 23.89 kg/m .    Exam:  Breasts:  Inspection of Breasts:  Symmetric bilaterally.  No puckering.  No skin changes.  Palpation of Breasts and Axillae: Firm, clogged ducts present on left breast at 11-1 o'clock position with palpation. Left breast tenderness upon palpation. Axillary Lymph Nodes:  No lymphadenopathy present     In-Clinic Test Results:  No results found for this or any previous visit (from the " past 24 hour(s)).    ASSESSMENT/PLAN:                                                        ICD-10-CM    1. Mastodynia of left breast  N64.4             Plan Discussed with Dr. Lanier and her recommendations are  Encouraged patient to decrease pumping by half of the time for about 10-12 minutes to decrease overstimulation of the breast. Continue relief measures and call or RTC prn if symptoms do not improve.  RTC prn and for 6 week PP visit.      RENE Randolph CNP  [facility] [place]

## 2023-01-19 ENCOUNTER — OFFICE VISIT (OUTPATIENT)
Dept: OBGYN | Facility: CLINIC | Age: 33
End: 2023-01-19
Payer: COMMERCIAL

## 2023-01-19 VITALS
HEIGHT: 66 IN | DIASTOLIC BLOOD PRESSURE: 60 MMHG | SYSTOLIC BLOOD PRESSURE: 98 MMHG | WEIGHT: 148 LBS | BODY MASS INDEX: 23.78 KG/M2

## 2023-01-19 DIAGNOSIS — N64.4 MASTODYNIA OF LEFT BREAST: Primary | ICD-10-CM

## 2023-01-19 PROCEDURE — 99212 OFFICE O/P EST SF 10 MIN: CPT | Mod: 24 | Performed by: NURSE PRACTITIONER

## 2023-01-30 ENCOUNTER — PRENATAL OFFICE VISIT (OUTPATIENT)
Dept: OBGYN | Facility: CLINIC | Age: 33
End: 2023-01-30
Payer: COMMERCIAL

## 2023-01-30 VITALS
WEIGHT: 148 LBS | BODY MASS INDEX: 24.66 KG/M2 | HEIGHT: 65 IN | SYSTOLIC BLOOD PRESSURE: 102 MMHG | DIASTOLIC BLOOD PRESSURE: 58 MMHG

## 2023-01-30 DIAGNOSIS — N39.3 SUI (STRESS URINARY INCONTINENCE, FEMALE): ICD-10-CM

## 2023-01-30 DIAGNOSIS — N39.41 URGE INCONTINENCE OF URINE: ICD-10-CM

## 2023-01-30 PROCEDURE — 99207 PR POST PARTUM EXAM: CPT | Performed by: OBSTETRICS & GYNECOLOGY

## 2023-01-30 RX ORDER — DIAPER,BRIEF,ADULT, DISPOSABLE
1200 EACH MISCELLANEOUS DAILY
COMMUNITY

## 2023-01-30 ASSESSMENT — ANXIETY QUESTIONNAIRES
5. BEING SO RESTLESS THAT IT IS HARD TO SIT STILL: NOT AT ALL
GAD7 TOTAL SCORE: 0
6. BECOMING EASILY ANNOYED OR IRRITABLE: NOT AT ALL
GAD7 TOTAL SCORE: 0
7. FEELING AFRAID AS IF SOMETHING AWFUL MIGHT HAPPEN: NOT AT ALL
3. WORRYING TOO MUCH ABOUT DIFFERENT THINGS: NOT AT ALL
1. FEELING NERVOUS, ANXIOUS, OR ON EDGE: NOT AT ALL
2. NOT BEING ABLE TO STOP OR CONTROL WORRYING: NOT AT ALL

## 2023-01-30 ASSESSMENT — PATIENT HEALTH QUESTIONNAIRE - PHQ9
5. POOR APPETITE OR OVEREATING: NOT AT ALL
SUM OF ALL RESPONSES TO PHQ QUESTIONS 1-9: 0

## 2023-01-30 NOTE — PROGRESS NOTES
"SUBJECTIVE:  La Cornelius,  is here for a postpartum visit. She had a Vacuum delivery on 12/15/22 delivering a healthy baby girl, named Bess weighing 7 lbs 7 oz at term.       HPI:  La presents for a postpartum visit. She has had some challenges with breastfeeding mostly with plugged ducts and a declining supply. Her daughter is thriving and they have help from La's mother. Her pain at the perineum has resolved. She has leaking of urine with sneezing. She has not resumed intercourse. Given the pain she has changed to exclusive pumping and is supplementing with formula. She believes her periods have resumed. She is not interested in OCPs for contraception as she had emotional side effects from Junel Fe.    Last PHQ-9 score on record=   PHQ-9 SCORE 2023   PHQ-9 Total Score MyChart -   PHQ-9 Total Score 0     FREMIN-7 SCORE 3/19/2021 2022 2023   Total Score - 0 (minimal anxiety) -   Total Score 0 0 0       Pap:   Lab Results   Component Value Date    PAP NIL 2021        Delivery complications:  Yes, prolonged 2nd stage  Breast feeding:  Yes, also pumping and formula feeding.  Bladder problems:  Urine leaking with cough and sneeze.  Bowel problems/hemorrhoids:  No  Episiotomy/laceration/incision healed? Yes: third degree laceration reparied  Vaginal flow:  None  [place]:  No  Contraception: none  Emotional adjustment: doing well and happy  Back to work:  point review of systems negative other than symptoms noted below or in the HPI.    OBJECTIVE:  Vitals: /58   Ht 1.651 m (5' 5\")   Wt 67.1 kg (148 lb)   LMP 2022   Breastfeeding Yes   BMI 24.63 kg/m    BMI= Body mass index is 24.63 kg/m .  General - pleasant female in no acute distress.  Breast - symmetric in appearance. On palpation the inferior breasts bilaterally have plugged ducts without lumps palpated. No axillary lymphadenopathy. The left breast as plugged ducts in the upper outer " quadrant.  Abdomen - No incision and soft. no palpable masses.  Pelvic - EG: normal adult female, BUS: within normal limits, Vagina: well rugated, no discharge, Cervix: no lesions or CMT, Uterus: firm, normal sized and nontender, anteverted in position. Adnexae: no masses or tenderness.  Rectovaginal - deferred.    ASSESSMENT:    ICD-10-CM    1. Routine postpartum follow-up  Z39.2       2. Urge incontinence of urine  N39.41       3. DAVID (stress urinary incontinence, female)  N39.3 Physical Therapy Referral          PLAN:  May resume normal activities without restrictions.  Pap smear was not done today.  Encouraged to pump as long as she is able to feel good about it. Any amount of breast milk will benefit her daughter but should not be at the expense of her mental health.   Discussed barrier contraception as hormonal options are not desired.  I also recommend seeing Pelvic PT now as she is having stress urinary incontinence.   Full counseling was provided, and all questions were answered.   Return to clinic in one year for an annual visit.   Geno Lanier MD

## 2023-06-04 ENCOUNTER — HEALTH MAINTENANCE LETTER (OUTPATIENT)
Age: 33
End: 2023-06-04

## 2024-07-20 ENCOUNTER — HEALTH MAINTENANCE LETTER (OUTPATIENT)
Age: 34
End: 2024-07-20

## 2025-08-09 ENCOUNTER — HEALTH MAINTENANCE LETTER (OUTPATIENT)
Age: 35
End: 2025-08-09